# Patient Record
Sex: FEMALE | Race: WHITE | NOT HISPANIC OR LATINO | Employment: OTHER | URBAN - METROPOLITAN AREA
[De-identification: names, ages, dates, MRNs, and addresses within clinical notes are randomized per-mention and may not be internally consistent; named-entity substitution may affect disease eponyms.]

---

## 2017-07-13 ENCOUNTER — TELEPHONE (OUTPATIENT)
Dept: FAMILY MEDICINE | Facility: CLINIC | Age: 62
End: 2017-07-13

## 2017-07-13 NOTE — TELEPHONE ENCOUNTER
Panel Management Review      Patient has the following on her problem list:     Depression / Dysthymia review  PHQ-9 SCORE 8/14/2014 11/4/2015 10/26/2016   Total Score 0 - -   Total Score MyChart - - 1 (Minimal depression)   Total Score - 0 1      Patient is due for:  None      Composite cancer screening  Chart review shows that this patient is due/due soon for the following Mammogram  Summary:    Patient is due/failing the following:   MAMMOGRAM and PHQ9    Action needed:   Patient needs to do PHQ9. and Patient needs referral/order: mammogram    Type of outreach:    Sent [facility] message.    Questions for provider review:    None                                                                                                                                    None known       Chart routed to Care Team .

## 2017-08-14 ENCOUNTER — TELEPHONE (OUTPATIENT)
Dept: FAMILY MEDICINE | Facility: CLINIC | Age: 62
End: 2017-08-14

## 2017-10-16 ENCOUNTER — RADIANT APPOINTMENT (OUTPATIENT)
Dept: MAMMOGRAPHY | Facility: CLINIC | Age: 62
End: 2017-10-16
Payer: COMMERCIAL

## 2017-10-16 DIAGNOSIS — Z12.31 VISIT FOR SCREENING MAMMOGRAM: ICD-10-CM

## 2017-10-16 PROCEDURE — G0202 SCR MAMMO BI INCL CAD: HCPCS | Mod: TC

## 2017-12-03 ENCOUNTER — HEALTH MAINTENANCE LETTER (OUTPATIENT)
Age: 62
End: 2017-12-03

## 2018-02-08 DIAGNOSIS — F32.5 MAJOR DEPRESSION IN COMPLETE REMISSION (H): ICD-10-CM

## 2018-02-08 DIAGNOSIS — E78.5 HYPERLIPIDEMIA LDL GOAL <130: ICD-10-CM

## 2018-02-08 DIAGNOSIS — L70.0 CYSTIC ACNE: ICD-10-CM

## 2018-02-08 RX ORDER — AMOXICILLIN 500 MG/1
500 CAPSULE ORAL DAILY
Qty: 90 CAPSULE | Refills: 0 | Status: SHIPPED | OUTPATIENT
Start: 2018-02-08 | End: 2018-02-22

## 2018-02-08 RX ORDER — SIMVASTATIN 40 MG
TABLET ORAL
Qty: 90 TABLET | Refills: 0 | Status: SHIPPED | OUTPATIENT
Start: 2018-02-08 | End: 2018-02-22

## 2018-02-08 NOTE — TELEPHONE ENCOUNTER
Drug: Amoxicillin  Last Fill Date: 11/1/2017  Quantity: 90        Drug: Amitriptyline  Last Fill Date: 11/1/2017  Quantity: 360        Drug: Simvastatin  Last Fill Date: 11/1/2017  Quantity: 90

## 2018-02-22 ENCOUNTER — OFFICE VISIT (OUTPATIENT)
Dept: FAMILY MEDICINE | Facility: CLINIC | Age: 63
End: 2018-02-22
Payer: COMMERCIAL

## 2018-02-22 VITALS
HEIGHT: 62 IN | WEIGHT: 126 LBS | BODY MASS INDEX: 23.19 KG/M2 | HEART RATE: 79 BPM | DIASTOLIC BLOOD PRESSURE: 62 MMHG | RESPIRATION RATE: 18 BRPM | SYSTOLIC BLOOD PRESSURE: 103 MMHG | TEMPERATURE: 98.1 F

## 2018-02-22 DIAGNOSIS — L70.0 CYSTIC ACNE: ICD-10-CM

## 2018-02-22 DIAGNOSIS — N81.11 MIDLINE CYSTOCELE: ICD-10-CM

## 2018-02-22 DIAGNOSIS — F32.5 MAJOR DEPRESSION IN COMPLETE REMISSION (H): ICD-10-CM

## 2018-02-22 DIAGNOSIS — Z00.00 ROUTINE GENERAL MEDICAL EXAMINATION AT A HEALTH CARE FACILITY: Primary | ICD-10-CM

## 2018-02-22 DIAGNOSIS — N81.6 RECTOCELE: ICD-10-CM

## 2018-02-22 DIAGNOSIS — E78.5 HYPERLIPIDEMIA LDL GOAL <130: ICD-10-CM

## 2018-02-22 LAB
CHOLEST SERPL-MCNC: 202 MG/DL
GLUCOSE SERPL-MCNC: 89 MG/DL (ref 70–99)
HDLC SERPL-MCNC: 82 MG/DL
LDLC SERPL CALC-MCNC: 104 MG/DL
NONHDLC SERPL-MCNC: 120 MG/DL
TRIGL SERPL-MCNC: 78 MG/DL

## 2018-02-22 PROCEDURE — 82947 ASSAY GLUCOSE BLOOD QUANT: CPT | Performed by: FAMILY MEDICINE

## 2018-02-22 PROCEDURE — 36415 COLL VENOUS BLD VENIPUNCTURE: CPT | Performed by: FAMILY MEDICINE

## 2018-02-22 PROCEDURE — 99396 PREV VISIT EST AGE 40-64: CPT | Performed by: FAMILY MEDICINE

## 2018-02-22 PROCEDURE — 80061 LIPID PANEL: CPT | Performed by: FAMILY MEDICINE

## 2018-02-22 RX ORDER — SIMVASTATIN 40 MG
40 TABLET ORAL AT BEDTIME
Qty: 90 TABLET | Refills: 3 | Status: SHIPPED | OUTPATIENT
Start: 2018-02-22 | End: 2019-03-02

## 2018-02-22 RX ORDER — AMOXICILLIN 500 MG/1
500 CAPSULE ORAL DAILY
Qty: 90 CAPSULE | Refills: 3 | Status: SHIPPED | OUTPATIENT
Start: 2018-02-22 | End: 2019-03-02

## 2018-02-22 ASSESSMENT — PAIN SCALES - GENERAL: PAINLEVEL: NO PAIN (0)

## 2018-02-22 NOTE — NURSING NOTE
"Chief Complaint   Patient presents with     Physical       Initial /62  Pulse 79  Temp 98.1  F (36.7  C) (Tympanic)  Resp 18  Ht 5' 1.5\" (1.562 m)  Wt 126 lb (57.2 kg)  BMI 23.42 kg/m2 Estimated body mass index is 23.42 kg/(m^2) as calculated from the following:    Height as of this encounter: 5' 1.5\" (1.562 m).    Weight as of this encounter: 126 lb (57.2 kg).  Medication Reconciliation: complete    "

## 2018-02-22 NOTE — PROGRESS NOTES
SUBJECTIVE:   CC: Gilda Petit is an 62 year old woman who presents for preventive health visit.     Healthy Habits:    Do you get at least three servings of calcium containing foods daily (dairy, green leafy vegetables, etc.)? yes    Amount of exercise or daily activities, outside of work: 2-3 day(s) per week    Problems taking medications regularly No    Medication side effects: No    Have you had an eye exam in the past two years? yes    Do you see a dentist twice per year? yes    Do you have sleep apnea, excessive snoring or daytime drowsiness?no  Hyperlipidemia Follow-Up       Rate your low fat/cholesterol diet?: good    Taking statin?  Yes, no muscle aches from statin    Other lipid medications/supplements?:  none      Depression Followup    Status since last visit: Stable      See PHQ-9 for current symptoms.  Other associated symptoms: None    Complicating factors:   Significant life event:  No               Current substance abuse:  None  Anxiety or Panic symptoms:  No  Feels the amitriptyline works well, she has tried other medications in the past but likes the sedating effect of the amitriptyline.  We talked about alternatives and the need to add something for sleep if needed but at this time she'd like to stay with the amitriptyline     PHQ-9  English PHQ-9   Any Language         Uses Amoxicillin to prevent cystic acne.  Has done this for years, has tried other antibacterial agents (doxy/minocycline) and gets a return of acne.  Has tried going off the Amoxicillin but the acne develops rather quickly.  She is encouraged to take a probiotic and she understand the risk of resistant bacteria.       Today's PHQ-2 Score:   PHQ-2 ( 1999 [employer]) 2/22/2018 12/22/2016   Q1: Little interest or pleasure in doing things 0 0   Q2: Feeling down, depressed or hopeless 0 0   PHQ-2 Score 0 0   Q1: Little interest or pleasure in doing things - -   Q2: Feeling down, depressed or hopeless - -   PHQ-2 Score - -  "      Abuse: Current or Past(Physical, Sexual or Emotional)- No  Do you feel safe in your environment - Yes    Social History   Substance Use Topics     Smoking status: Never Smoker     Smokeless tobacco: Never Used     Alcohol use No     If you drink alcohol do you typically have >3 drinks per day or >7 drinks per week? No                     Reviewed orders with patient.  Reviewed health maintenance and updated orders accordingly - Yes  Labs reviewed in EPIC  BP Readings from Last 3 Encounters:   02/22/18 103/62   12/22/16 109/58   12/09/16 106/69    Wt Readings from Last 3 Encounters:   02/22/18 126 lb (57.2 kg)   12/22/16 120 lb 12.8 oz (54.8 kg)   12/09/16 122 lb (55.3 kg)                        Pertinent mammograms are reviewed under the imaging tab.  History of abnormal Pap smear: NO - age 30-65 PAP every 5 years with negative HPV co-testing recommended    Reviewed and updated as needed this visit by clinical staff  Tobacco  Allergies  Meds  Med Hx  Surg Hx  Fam Hx         Reviewed and updated as needed this visit by Provider            ROS:  C: NEGATIVE for fever, chills, change in weight  I: NEGATIVE for worrisome rashes, moles or lesions  E: NEGATIVE for vision changes or irritation  ENT: NEGATIVE for ear, mouth and throat problems  R: NEGATIVE for significant cough or SOB  B: NEGATIVE for masses, tenderness or discharge  CV: NEGATIVE for chest pain, palpitations or peripheral edema  GI: NEGATIVE for nausea, abdominal pain, heartburn, or change in bowel habits   menopausal female: still having some issues with prolapse, didn't see gyn last year and wants another referral.   M: NEGATIVE for significant arthralgias or myalgia  N: NEGATIVE for weakness, dizziness or paresthesias  P: NEGATIVE for changes in mood or affect     OBJECTIVE:   /62  Pulse 79  Temp 98.1  F (36.7  C) (Tympanic)  Resp 18  Ht 5' 1.5\" (1.562 m)  Wt 126 lb (57.2 kg)  BMI 23.42 kg/m2  EXAM:  GENERAL: healthy, alert and " no distress  EYES: Eyes grossly normal to inspection, PERRL and conjunctivae and sclerae normal  HENT: ear canals and TM's normal, nose and mouth without ulcers or lesions  NECK: no adenopathy, no asymmetry, masses, or scars and thyroid normal to palpation  RESP: lungs clear to auscultation - no rales, rhonchi or wheezes  BREAST: normal without masses, tenderness or nipple discharge and no palpable axillary masses or adenopathy  CV: regular rate and rhythm, normal S1 S2, no S3 or S4, no murmur, click or rub, no peripheral edema and peripheral pulses strong  ABDOMEN: soft, nontender, no hepatosplenomegaly, no masses and bowel sounds normal   (female): normal female external genitalia, normal urethral meatus , vaginal mucosal atrophy, normal cervix, adnexae, and uterus without masses., cystocele present and rectocele present  MS: no gross musculoskeletal defects noted, no edema  SKIN: no suspicious lesions or rashes  NEURO: Normal strength and tone, mentation intact and speech normal  PSYCH: mentation appears normal, affect normal/bright    ASSESSMENT/PLAN:   1. Routine general medical examination at a health care facility     - Glucose    2. Hyperlipidemia LDL goal <130     - simvastatin (ZOCOR) 40 MG tablet; Take 1 tablet (40 mg) by mouth At Bedtime  Dispense: 90 tablet; Refill: 3  - Lipid panel reflex to direct LDL Fasting    3. Cystic acne     - amoxicillin (AMOXIL) 500 MG capsule; Take 1 capsule (500 mg) by mouth daily  Dispense: 90 capsule; Refill: 3    4. Major depression in complete remission (H)     - amitriptyline (ELAVIL) 25 MG tablet; Take 4 tablets (100 mg) by mouth At Bedtime  Dispense: 360 tablet; Refill: 3    5. Midline cystocele     - OB/GYN REFERRAL    6. Rectocele     - OB/GYN REFERRAL    COUNSELING:   Reviewed preventive health counseling, as reflected in patient instructions       Regular exercise       Healthy diet/nutrition         reports that she has never smoked. She has never used  "smokeless tobacco.    Estimated body mass index is 23.42 kg/(m^2) as calculated from the following:    Height as of this encounter: 5' 1.5\" (1.562 m).    Weight as of this encounter: 126 lb (57.2 kg).       Counseling Resources:  ATP IV Guidelines  Pooled Cohorts Equation Calculator  Breast Cancer Risk Calculator  FRAX Risk Assessment  ICSI Preventive Guidelines  Dietary Guidelines for Americans, 2010  USDA's MyPlate  ASA Prophylaxis  Lung CA Screening    Gris Uribe MD  [facility]  "

## 2018-02-22 NOTE — MR AVS SNAPSHOT
After Visit Summary   2/22/2018    Gilda Petit    MRN: [id number]           Patient Information     Date Of Birth          1955        Visit Information        Provider Department      2/22/2018 8:40 AM Gris Uribe MD [facility]        Today's Diagnoses     Routine general medical examination at a health care facility    -  1    Hyperlipidemia LDL goal <130        Cystic acne        Major depression in complete remission (H)        Midline cystocele        Rectocele          Care Instructions          Thank you for choosing [facility].  You may be receiving a survey in the mail from [facility] regarding your visit today.  Please take a few minutes to complete and return the survey to let us know how we are doing.      Our Clinic hours are:  Mondays    7:20 am - 7 pm  Tues -  Fri  7:20 am - 5 pm    Clinic Phone: [phone]    The clinic lab opens at 7:30 am Mon - Fri and appointments are required.    [facility]  Ph. [phone]  Monday-Thursday 8 am - 7pm  Tues/Wed/Fri 8 am - 5:30 pm         Preventive Health Recommendations  Female Ages 50 - 64    Yearly exam: See your health care provider every year in order to  o Review health changes.   o Discuss preventive care.    o Review your medicines if your doctor has prescribed any.      Get a Pap test every three years (unless you have an abnormal result and your provider advises testing more often).    If you get Pap tests with HPV test, you only need to test every 5 years, unless you have an abnormal result.     You do not need a Pap test if your uterus was removed (hysterectomy) and you have not had cancer.    You should be tested each year for STDs (sexually transmitted diseases) if you're at risk.     Have a mammogram every 1 to 2 years.    Have a colonoscopy at age 50, or have a yearly FIT test (stool test). These exams screen for colon cancer.      Have a cholesterol test every 5 years,  or more often if advised.    Have a diabetes test (fasting glucose) every three years. If you are at risk for diabetes, you should have this test more often.     If you are at risk for osteoporosis (brittle bone disease), think about having a bone density scan (DEXA).    Shots: Get a flu shot each year. Get a tetanus shot every 10 years.    Nutrition:     Eat at least 5 servings of fruits and vegetables each day.    Eat whole-grain bread, whole-wheat pasta and brown rice instead of white grains and rice.    Talk to your provider about Calcium and Vitamin D.     Lifestyle    Exercise at least 150 minutes a week (30 minutes a day, 5 days a week). This will help you control your weight and prevent disease.    Limit alcohol to one drink per day.    No smoking.     Wear sunscreen to prevent skin cancer.     See your dentist every six months for an exam and cleaning.    See your eye doctor every 1 to 2 years.            Follow-ups after your visit        Additional Services     OB/GYN REFERRAL       Your provider has referred you to:  [facility]: [facility] [phone]   [email or web address]    Please be aware that coverage of these services is subject to the terms and limitations of your health insurance plan.  Call member services at your health plan with any benefit or coverage questions.      Please bring the following with you to your appointment:    (1) Any X-Rays, CTs or MRIs which have been performed.  Contact the facility where they were done to arrange for  prior to your scheduled appointment.   (2) List of current medications   (3) This referral request   (4) Any documents/labs given to you for this referral                  Your next 10 appointments already scheduled     Mar 16, 2018  9:30 AM CDT   [facility]    [address] [address] [phone]     "       Bring a current list of meds and any records pertaining to this visit. For Physicals, please bring immunization records and any forms needing to be filled out. Please arrive 10 minutes early to complete paperwork.              Who to contact     If you have questions or need follow up information about today's clinic visit or your schedule please contact [facility] directly at [phone].  Normal or non-critical lab and imaging results will be communicated to you by BetterYouhart, letter or phone within 4 business days after the clinic has received the results. If you do not hear from us within 7 days, please contact the clinic through [employer] or phone. If you have a critical or abnormal lab result, we will notify you by phone as soon as possible.  Submit refill requests through [employer] or call your pharmacy and they will forward the refill request to us. Please allow 3 business days for your refill to be completed.          Additional Information About Your Visit        BetterYouhart Information     [employer]fish gives you secure access to your electronic health record. If you see a primary care provider, you can also send messages to your care team and make appointments. If you have questions, please call your primary care clinic.  If you do not have a primary care provider, please call [phone] and they will assist you.        Care EveryWhere ID     This is your Care EveryWhere ID. This could be used by other organizations to access your [place] medical records  [id number]        Your Vitals Were     Pulse Temperature Respirations Height BMI (Body Mass Index)       79 98.1  F (36.7  C) (Tympanic) 18 5' 1.5\" (1.562 m) 23.42 kg/m2        Blood Pressure from Last 3 Encounters:   02/22/18 103/62   12/22/16 109/58   12/09/16 106/69    Weight from Last 3 Encounters:   02/22/18 126 lb (57.2 kg)   12/22/16 120 lb 12.8 oz (54.8 kg)   12/09/16 122 lb (55.3 kg)              We Performed the Following     " DEPRESSION ACTION PLAN (DAP)     Glucose     Lipid panel reflex to direct LDL Fasting     OB/GYN REFERRAL          Today's Medication Changes          These changes are accurate as of 2/22/18  9:16 AM.  If you have any questions, ask your nurse or doctor.               These medicines have changed or have updated prescriptions.        Dose/Directions    amitriptyline 25 MG tablet   Commonly known as:  ELAVIL   This may have changed:  See the new instructions.   Used for:  Major depression in complete remission (H)   Changed by:  Gris Uribe MD        Dose:  100 mg   Take 4 tablets (100 mg) by mouth At Bedtime   Quantity:  360 tablet   Refills:  3       simvastatin 40 MG tablet   Commonly known as:  ZOCOR   This may have changed:  See the new instructions.   Used for:  Hyperlipidemia LDL goal <130   Changed by:  Gris Uribe MD        Dose:  40 mg   Take 1 tablet (40 mg) by mouth At Bedtime   Quantity:  90 tablet   Refills:  3            Where to get your medicines      These medications were sent to [facility] - [address]  [address] [address]    Hours:  Mon-Fri 8:30am-5:00pm Toll Free [phone] Phone:  [phone]     amitriptyline 25 MG tablet    amoxicillin 500 MG capsule    simvastatin 40 MG tablet                Primary Care Provider Office Phone # Fax #    Gris Uribe [phone] [address] [address] [address]        Equal Access to Services     [facility] AH: Hadii aad ku hadasho Soomaali, waaxda luqadaha, qaybta kaalmada adeegyada, kendall hdez haysae west . So [facility] [phone].    ATENCIÓN: Si habla español, tiene a vasquez disposición servicios gratuitos de asistencia lingüística. Llame al [phone].    We comply with applicable federal civil rights laws and [place] laws. We do not discriminate on the basis of race, color, national origin, age, disability, sex,  sexual orientation, or gender identity.            Thank you!     Thank you for choosing [facility]  for your care. Our goal is always to provide you with excellent care. Hearing back from our patients is one way we can continue to improve our services. Please take a few minutes to complete the written survey that you may receive in the mail after your visit with us. Thank you!             Your Updated Medication List - Protect others around you: Learn how to safely use, store and throw away your medicines at www.disposemymeds.org.          This list is accurate as of 2/22/18  9:16 AM.  Always use your most recent med list.                   Brand Name Dispense Instructions for use Diagnosis    amitriptyline 25 MG tablet    ELAVIL    360 tablet    Take 4 tablets (100 mg) by mouth At Bedtime    Major depression in complete remission (H)       amoxicillin 500 MG capsule    AMOXIL    90 capsule    Take 1 capsule (500 mg) by mouth daily    Cystic acne       simvastatin 40 MG tablet    ZOCOR    90 tablet    Take 1 tablet (40 mg) by mouth At Bedtime    Hyperlipidemia LDL goal <130

## 2018-02-22 NOTE — PATIENT INSTRUCTIONS
Thank you for choosing [facility].  You may be receiving a survey in the mail from [facility] regarding your visit today.  Please take a few minutes to complete and return the survey to let us know how we are doing.      Our Clinic hours are:  Mondays    7:20 am - 7 pm  Tues - Fri  7:20 am - 5 pm    Clinic Phone: [phone]    The clinic lab opens at 7:30 am Mon - Fri and appointments are required.    [facility]. [phone]  Monday-Thursday 8 am - 7pm  Tues/Wed/Fri 8 am - 5:30 pm         Preventive Health Recommendations  Female Ages 50 - 64    Yearly exam: See your health care provider every year in order to  o Review health changes.   o Discuss preventive care.    o Review your medicines if your doctor has prescribed any.      Get a Pap test every three years (unless you have an abnormal result and your provider advises testing more often).    If you get Pap tests with HPV test, you only need to test every 5 years, unless you have an abnormal result.     You do not need a Pap test if your uterus was removed (hysterectomy) and you have not had cancer.    You should be tested each year for STDs (sexually transmitted diseases) if you're at risk.     Have a mammogram every 1 to 2 years.    Have a colonoscopy at age 50, or have a yearly FIT test (stool test). These exams screen for colon cancer.      Have a cholesterol test every 5 years, or more often if advised.    Have a diabetes test (fasting glucose) every three years. If you are at risk for diabetes, you should have this test more often.     If you are at risk for osteoporosis (brittle bone disease), think about having a bone density scan (DEXA).    Shots: Get a flu shot each year. Get a tetanus shot every 10 years.    Nutrition:     Eat at least 5 servings of fruits and vegetables each day.    Eat whole-grain bread, whole-wheat pasta and brown rice instead of white grains and rice.    Talk to your provider about Calcium  and Vitamin D.     Lifestyle    Exercise at least 150 minutes a week (30 minutes a day, 5 days a week). This will help you control your weight and prevent disease.    Limit alcohol to one drink per day.    No smoking.     Wear sunscreen to prevent skin cancer.     See your dentist every six months for an exam and cleaning.    See your eye doctor every 1 to 2 years.

## 2018-02-23 ASSESSMENT — PATIENT HEALTH QUESTIONNAIRE - PHQ9: SUM OF ALL RESPONSES TO PHQ QUESTIONS 1-9: 0

## 2018-03-16 ENCOUNTER — OFFICE VISIT (OUTPATIENT)
Dept: OBGYN | Facility: CLINIC | Age: 63
End: 2018-03-16
Payer: COMMERCIAL

## 2018-03-16 VITALS
HEIGHT: 62 IN | RESPIRATION RATE: 14 BRPM | SYSTOLIC BLOOD PRESSURE: 114 MMHG | DIASTOLIC BLOOD PRESSURE: 64 MMHG | BODY MASS INDEX: 23.37 KG/M2 | WEIGHT: 127 LBS | TEMPERATURE: 98.2 F | HEART RATE: 80 BPM

## 2018-03-16 DIAGNOSIS — N81.10 VAGINAL WALL PROLAPSE WITHOUT UTERINE PROLAPSE: Primary | ICD-10-CM

## 2018-03-16 LAB
ALBUMIN UR-MCNC: NEGATIVE MG/DL
APPEARANCE UR: CLEAR
BILIRUB UR QL STRIP: NEGATIVE
COLOR UR AUTO: YELLOW
GLUCOSE UR STRIP-MCNC: NEGATIVE MG/DL
HGB UR QL STRIP: NEGATIVE
KETONES UR STRIP-MCNC: NEGATIVE MG/DL
LEUKOCYTE ESTERASE UR QL STRIP: ABNORMAL
NITRATE UR QL: NEGATIVE
PH UR STRIP: 7 PH (ref 5–7)
RBC #/AREA URNS AUTO: NORMAL /HPF
SOURCE: ABNORMAL
SP GR UR STRIP: 1.01 (ref 1–1.03)
UROBILINOGEN UR STRIP-ACNC: 0.2 EU/DL (ref 0.2–1)
WBC #/AREA URNS AUTO: NORMAL /HPF

## 2018-03-16 PROCEDURE — 99203 OFFICE O/P NEW LOW 30 MIN: CPT | Performed by: OBSTETRICS & GYNECOLOGY

## 2018-03-16 PROCEDURE — 81001 URINALYSIS AUTO W/SCOPE: CPT | Performed by: OBSTETRICS & GYNECOLOGY

## 2018-03-16 PROCEDURE — 51798 US URINE CAPACITY MEASURE: CPT | Performed by: OBSTETRICS & GYNECOLOGY

## 2018-03-16 RX ORDER — ESTRADIOL 10 UG/1
INSERT VAGINAL
Qty: 30 TABLET | Refills: 1 | Status: ON HOLD | OUTPATIENT
Start: 2018-03-16 | End: 2018-09-25

## 2018-03-16 RX ORDER — OXYCODONE HCL 10 MG/1
10 TABLET, FILM COATED, EXTENDED RELEASE ORAL ONCE
Status: CANCELLED | OUTPATIENT
Start: 2018-03-16 | End: 2018-03-16

## 2018-03-16 RX ORDER — KETOROLAC TROMETHAMINE 30 MG/ML
30 INJECTION, SOLUTION INTRAMUSCULAR; INTRAVENOUS ONCE
Status: CANCELLED | OUTPATIENT
Start: 2018-03-16 | End: 2018-03-16

## 2018-03-16 RX ORDER — PHENAZOPYRIDINE HYDROCHLORIDE 200 MG/1
200 TABLET, FILM COATED ORAL ONCE
Status: CANCELLED | OUTPATIENT
Start: 2018-03-16 | End: 2018-03-16

## 2018-03-16 RX ORDER — CEFAZOLIN SODIUM 2 G/100ML
2 INJECTION, SOLUTION INTRAVENOUS
Status: CANCELLED | OUTPATIENT
Start: 2018-03-16

## 2018-03-16 NOTE — PROGRESS NOTES
Gilda is a 62 year old [id number]  female who presents for consultation as requested by Gris Uribe for known cystocele; she was seen in consult by Dr. Johnson in  for 2+ cystocele and poor bladder emptying seen on UDS; she was taught self cathing prn and had a trial of size 3 ring pessary; notes indicate the pessary helped some but she did not persist with it and decided to try to live with the symptoms.  Now she feels the prolapse is worse, and involving the rectum, has to work hard to empty bladder and to defecate; she has no incontinence, even when used pessry; she has not had a hysterectomy nor is she on any hormones.    Patient Active Problem List    Diagnosis Date Noted     Advance Care Planning 2011     Priority: Medium     Advance Care Planning 2016: Receipt of ACP document:  Received: Health Care Directive which was witnessed or notarized on 16.  Document not previously scanned.  Validation form completed and sent with document to be scanned.  Code Status needs to be updated to reflect choices in most recent ACP document.  Confirmed/documented designated decision maker(s).  Added by Brittney Ramirez RN, Advance Care Planning Liaison.  Advance Care Planning 11: Patient has completed an Advance/Health Care Directive (HCD), to bring in copy to be scanned into Epic. Xiomara Rhoades         Thyroid nodule 2011     Priority: Medium     2011  Repeat us in 1 year-- multiple small nodules        Major depression in complete remission (H) 2010     Priority: Medium     HYPERLIPIDEMIA LDL GOAL <130 10/31/2010     Priority: Medium     Contact dermatitis and other eczema, due to unspecified cause      Priority: Medium     Perioral dermatitis       Primary hypercoagulable state (H)      Priority: Medium     Factory V Leiden ( Heterogygate)       Cystocele, midline 2005     Priority: Medium     With h/o incomplete voiding; taught to self cath; tried size 3 ring with support  pessary in , partially improved her symptoms       Absence of menstruation 2004     Priority: Medium     Missed Menses         All systems were reviewed and pertinent information in noted in subjective/HPI.    Past Medical History:   Diagnosis Date     Absence of menstruation 2004    Missed Menses     Bipolar disorder, unspecified     Ulices GRUBER [phone]-Mood disorder (not otherwise specified) consider bipolar, hypomania and not depression.     Contact dermatitis and other eczema, due to unspecified cause     Perioral dermatitis     Manic disorder, recurrent episode, moderate (H)     Mood disorder (not otherwise specified) consider bipolar, hypomania and not depression.     Primary hypercoagulable state (H)     Factory V Leiden ( Heterogygate)       Past Surgical History:   Procedure Laterality Date     COLONOSCOPY  05     COLONOSCOPY N/A 2016    Procedure: COLONOSCOPY;  Surgeon: Ehasn Hager MD;  Location: [facility] DILATION/CURETTAGE DIAG/THER NON OB      D & C     SURGICAL HISTORY OF -        x3     SURGICAL HISTORY OF -       Miscarriages x2         Current Outpatient Prescriptions:      Omega-3 Fatty Acids (FISH OIL PO), , Disp: , Rfl:      estradiol (VAGIFEM) 10 MCG TABS vaginal tablet, Place 1 tab vaginally at bedtime daily for 5 days then twice a week until surgery, Disp: 30 tablet, Rfl: 1     simvastatin (ZOCOR) 40 MG tablet, Take 1 tablet (40 mg) by mouth At Bedtime, Disp: 90 tablet, Rfl: 3     amoxicillin (AMOXIL) 500 MG capsule, Take 1 capsule (500 mg) by mouth daily, Disp: 90 capsule, Rfl: 3     amitriptyline (ELAVIL) 25 MG tablet, Take 4 tablets (100 mg) by mouth At Bedtime, Disp: 360 tablet, Rfl: 3    ALLERGIES:  Metal [staples]; Tetracycline; and Adhesive tape    Social History     Social History     Marital status:      Spouse name: N/A     Number of children: N/A     Years of education: N/A     Social History Main Topics     Smoking status: Never  "Smoker     Smokeless tobacco: Never Used     Alcohol use No     Drug use: No     Sexual activity: Yes     Partners: Male     Other Topics Concern     Parent/Sibling W/ Cabg, Mi Or Angioplasty Before 65f 55m? Yes     father had 2 MIs and 2 strokes. age 45 @ first MI and 58 when he passed away     Social History Narrative       Family History   Problem Relation Age of Onset     Musculoskeletal Disorder Mother      Depression Mother      Neurologic Disorder Mother      HEART DISEASE Father      premature heart disease- in his 50s     CEREBROVASCULAR DISEASE Father      C.A.D. Father      Cardiovascular Father      Lipids Father      Hypertension Brother      Lipids Brother      Depression Sister      Eye Disorder Sister      factor 5 lieden     Lipids Sister      Thyroid Disease Sister      Depression Sister      Lipids Sister      Depression Daughter      Allergies Daughter      Arthritis Daughter        OBJECTIVE:  Vitals: /64 (BP Location: Right arm, Patient Position: Chair, Cuff Size: Adult Regular)  Pulse 80  Temp 98.2  F (36.8  C) (Tympanic)  Resp 14  Ht 5' 1.5\" (1.562 m)  Wt 127 lb (57.6 kg)  Breastfeeding? No  BMI 23.61 kg/m2 BMI= Body mass index is 23.61 kg/(m^2).   No LMP recorded. Patient is postmenopausal.     GENERAL APPEARANCE: healthy, alert and no distress  ABDOMEN:  soft, nontender, no hepato-splenomegaly or hernias  PELVIC:  EGBUS:  Perineum well preserved both post wall visible with strain; no leak with cough  VAGINA:  2+ proximal cystocele; UV angle not hypermobile; 2-3+ distal rectocele; apex well supported with some narrowing around cervix; poorly estrogenized  CERVIX:  smooth, non-friable, no gross lesions, thin-layer PAP was not taken   UTERUS:  anteverted, not enlarged, non tender  ADNEXAE:  non-tender, no masses palpable, no cul de sac nodularity    POSTVOID RESIDUAL=0cc    ASSESSMENT:      ICD-10-CM    1. Vaginal wall prolapse without uterine prolapse N81.10 *UA reflex to " Microscopic     Omega-3 Fatty Acids (FISH OIL PO)     MEASURE POST-VOID RESIDUAL URINE/BLADDER CAPACITY, US NON-IMAGING     Urine Microscopic     estradiol (VAGIFEM) 10 MCG TABS vaginal tablet       PLAN:  I discussed at length with Gilda that although the cystocele seems the same by observation, she now has a rectocele that is likely new; we discussed conservative tx with pessary vs surgery with a & P repair, the possibility of uncovering hidden incontinence; that we would not likely need to remove the uterus.  I also discussed the ultra low dose vaginal estradiol therapy that can help prepare the vagina for a better repair  Zack Holcomb MD  [facility]    Cc: Dr. Uribe    Duration of visit:  30 minutes, >50% in discussion of current issues, treatment options and treatment planning.  ZACK Holcomb MD

## 2018-03-16 NOTE — NURSING NOTE
"Chief Complaint   Patient presents with     Consult     bladder prolapse       Initial /64 (BP Location: Right arm, Patient Position: Chair, Cuff Size: Adult Regular)  Pulse 80  Temp 98.2  F (36.8  C) (Tympanic)  Resp 14  Ht 5' 1.5\" (1.562 m)  Wt 127 lb (57.6 kg)  Breastfeeding? No  BMI 23.61 kg/m2 Estimated body mass index is 23.61 kg/(m^2) as calculated from the following:    Height as of this encounter: 5' 1.5\" (1.562 m).    Weight as of this encounter: 127 lb (57.6 kg).  Medication Reconciliation: complete   Ene Mir, LAMBERTO      "

## 2018-03-16 NOTE — MR AVS SNAPSHOT
"              After Visit Summary   3/16/2018    Gilda Petit    MRN: [id number]           Patient Information     Date Of Birth          1955        Visit Information        Provider Department      3/16/2018 9:30 AM Julienne Holcomb MD [facility]        Today's Diagnoses     Vaginal wall prolapse without uterine prolapse    -  1       Follow-ups after your visit        Who to contact     If you have questions or need follow up information about today's clinic visit or your schedule please contact [facility] directly at [phone].  Normal or non-critical lab and imaging results will be communicated to you by [employer], letter or phone within 4 business days after the clinic has received the results. If you do not hear from us within 7 days, please contact the clinic through [employer] or phone. If you have a critical or abnormal lab result, we will notify you by phone as soon as possible.  Submit refill requests through [employer] or call your pharmacy and they will forward the refill request to us. Please allow 3 business days for your refill to be completed.          Additional Information About Your Visit        MyChart Information     [employer] gives you secure access to your electronic health record. If you see a primary care provider, you can also send messages to your care team and make appointments. If you have questions, please call your primary care clinic.  If you do not have a primary care provider, please call [phone] and they will assist you.        Care EveryWhere ID     This is your Care EveryWhere ID. This could be used by other organizations to access your [place] medical records  [id number]        Your Vitals Were     Pulse Temperature Respirations Height Breastfeeding? BMI (Body Mass Index)    80 98.2  F (36.8  C) (Tympanic) 14 5' 1.5\" (1.562 m) No 23.61 kg/m2       Blood Pressure from Last 3 Encounters:   03/16/18 114/64   02/22/18 103/62   12/22/16 " 109/58    Weight from Last 3 Encounters:   03/16/18 127 lb (57.6 kg)   02/22/18 126 lb (57.2 kg)   12/22/16 120 lb 12.8 oz (54.8 kg)              We Performed the Following     *UA reflex to Microscopic     MEASURE POST-VOID RESIDUAL URINE/BLADDER CAPACITY, US NON-IMAGING     [facility]ero-Operative Worksheet GYN     Urine Microscopic          Today's Medication Changes          These changes are accurate as of 3/16/18 10:07 AM.  If you have any questions, ask your nurse or doctor.               Start taking these medicines.        Dose/Directions    estradiol 10 MCG Tabs vaginal tablet   Commonly known as:  VAGIFEM   Used for:  Vaginal wall prolapse without uterine prolapse   Started by:  Julienne Holcomb MD        Place 1 tab vaginally at bedtime daily for 5 days then twice a week until surgery   Quantity:  30 tablet   Refills:  1            Where to get your medicines      These medications were sent to [facility] [place] [address] [facility]  [address] [address]    Hours:  Mon-Fri 8:30am-5:00pm Toll Free [phone] Phone:  [phone]     estradiol 10 MCG Tabs vaginal tablet                Primary Care Provider Office Phone # Fax #    Gris Uribe [phone] [address] [facility] [address]        Equal Access to Services     FREDERICK RAMOS AH: Hadii bal ku hadasho Soomaali, waaxda luqadaha, qaybta kaalmada adeegyada, kendall medrano. So [facility] [phone].    ATENCIÓN: Si habla español, tiene a vasquez disposición servicios gratuitos de asistencia lingüística. Llame al [phone].    We comply with applicable federal civil rights laws and [place] laws. We do not discriminate on the basis of race, color, national origin, age, disability, sex, sexual orientation, or gender identity.            Thank you!     Thank you for choosing [facility]  for your care. Our goal is always to  provide you with excellent care. Hearing back from our patients is one way we can continue to improve our services. Please take a few minutes to complete the written survey that you may receive in the mail after your visit with us. Thank you!             Your Updated Medication List - Protect others around you: Learn how to safely use, store and throw away your medicines at www.disposemymeds.org.          This list is accurate as of 3/16/18 10:07 AM.  Always use your most recent med list.                   Brand Name Dispense Instructions for use Diagnosis    amitriptyline 25 MG tablet    ELAVIL    360 tablet    Take 4 tablets (100 mg) by mouth At Bedtime    Major depression in complete remission (H)       amoxicillin 500 MG capsule    AMOXIL    90 capsule    Take 1 capsule (500 mg) by mouth daily    Cystic acne       estradiol 10 MCG Tabs vaginal tablet    VAGIFEM    30 tablet    Place 1 tab vaginally at bedtime daily for 5 days then twice a week until surgery    Vaginal wall prolapse without uterine prolapse       FISH OIL PO       Vaginal wall prolapse without uterine prolapse       simvastatin 40 MG tablet    ZOCOR    90 tablet    Take 1 tablet (40 mg) by mouth At Bedtime    Hyperlipidemia LDL goal <130

## 2018-06-14 ENCOUNTER — TELEPHONE (OUTPATIENT)
Dept: OBGYN | Facility: CLINIC | Age: 63
End: 2018-06-14

## 2018-06-14 NOTE — TELEPHONE ENCOUNTER
Reason for Call:  Other surgery    Detailed comments: Pt called to let you know that she would like to schedule the surgery you recommended for her at her last appointment. Pt would like to do it late August or early September.    Phone Number Patient can be reached at: Home number on file [phone] (home)    Best Time: any    Can we leave a detailed message on this number? YES    Call taken on 6/14/2018 at 12:08 PM by Gris Tate

## 2018-06-15 DIAGNOSIS — N81.10 VAGINAL WALL PROLAPSE: Primary | ICD-10-CM

## 2018-06-15 RX ORDER — OXYCODONE HCL 10 MG/1
10 TABLET, FILM COATED, EXTENDED RELEASE ORAL ONCE
Status: CANCELLED | OUTPATIENT
Start: 2018-06-15 | End: 2018-06-15

## 2018-06-15 RX ORDER — CEFAZOLIN SODIUM 1 G/50ML
1 INJECTION, SOLUTION INTRAVENOUS SEE ADMIN INSTRUCTIONS
Status: CANCELLED | OUTPATIENT
Start: 2018-06-15

## 2018-06-15 RX ORDER — PHENAZOPYRIDINE HYDROCHLORIDE 200 MG/1
200 TABLET, FILM COATED ORAL ONCE
Status: CANCELLED | OUTPATIENT
Start: 2018-06-15 | End: 2018-06-15

## 2018-06-15 RX ORDER — CEFAZOLIN SODIUM 2 G/100ML
2 INJECTION, SOLUTION INTRAVENOUS
Status: CANCELLED | OUTPATIENT
Start: 2018-06-15

## 2018-06-20 ENCOUNTER — HOSPITAL ENCOUNTER (OUTPATIENT)
Facility: CLINIC | Age: 63
End: 2018-06-20
Attending: OBSTETRICS & GYNECOLOGY | Admitting: OBSTETRICS & GYNECOLOGY
Payer: COMMERCIAL

## 2018-06-20 NOTE — TELEPHONE ENCOUNTER
Type of surgery:   Anterior and posterior vaginal repair       Reason for procedure tighten vaginal walls to treat        [facility]  Date and time of surgery: 8/28/18 @ 7:30 AM  Surgeon: CORI  Pre-Op Appt Date: pt will schedule  Post-Op Appt Date: 2 weeks   Packet sent out: Yes  Pre-cert/Authorization completed:  No  Date: 6/20/18

## 2018-06-28 NOTE — TELEPHONE ENCOUNTER
Pt called to reschedule surgery into Sept.  Cancelled 8-28-18 surgery - will have to call pt when Sept calendar is available.    -Mallory SPRAGUE [facility]  Clinic Station [place]

## 2018-07-12 NOTE — TELEPHONE ENCOUNTER
Pt called to reschedule surgery to 9-25-18 at 7:30am.    -Mallory SPRAGUE [facility]  Clinic Station

## 2018-07-12 NOTE — TELEPHONE ENCOUNTER
Reason for Call:  Other call back    Detailed comments: Pt is wondering how long she will be off from work.  Will send to provider to advise    Phone Number Patient can be reached at: Home number on file [phone] (home)    Best Time: any    Can we leave a detailed message on this number? YES    Call taken on 7/12/2018 at 2:36 PM by Mallory Escobar

## 2018-07-12 NOTE — TELEPHONE ENCOUNTER
"  You are now scheduled for surgery at [facility].  Below are the details for your surgery.  Please read the \"Preparing for Your Surgery\" instructions and let us know if you have any questions.    Type of surgery: Anterior and posterior vaginal repair  Surgeon:  Julienne Holcomb MD  Location of surgery: [facility] OR    Date of surgery: 9-18-18    Time: 7:30am   Arrival Time: 6:00am    Time can change, to be confirmed a couple of days prior by pre-op surgery nurse.    Pre-Op Appt Date: Patient to schedule with a PCP or Family Practice Provider within 30 days to the surgery.  Post-Op Appt Date: To be determined by provider     Packet sent out: Yes  Pre-cert/Authorization completed:  TBD by Financial Securing Office.   MA Sterilization/Hysterectomy Acknowledgment Consent signed: Not Applicable    [facility]  [phone]    Fax: [phone]  Same Day Surgery [phone]  Fax: [phone]    "

## 2018-07-12 NOTE — TELEPHONE ENCOUNTER
If job does not involve lifting, then could return after two weeks, otherwise is 6 week recovery   Julienne Holcomb

## 2018-08-30 ENCOUNTER — TELEPHONE (OUTPATIENT)
Dept: OBGYN | Facility: CLINIC | Age: 63
End: 2018-08-30

## 2018-08-30 NOTE — TELEPHONE ENCOUNTER
Forms were signed and faxed then sent to be scanned and copy at .    Gris Tate  Clinic Station

## 2018-08-30 NOTE — TELEPHONE ENCOUNTER
Reason for Call:  Form, our goal is to have forms completed with 72 hours, however, some forms may require a visit or additional information.    Type of letter, form or note:  disability    Who is the form from?: Patient    Where did the form come from: Patient or family brought in       What clinic location was the form placed at?: [facility]    Where the form was placed: Given to physician    What number is listed as a contact on the form?: [phone]       Additional comments: FV      Call taken on 8/30/2018 at 3:20 PM by Gris Tate

## 2018-09-20 ENCOUNTER — OFFICE VISIT (OUTPATIENT)
Dept: FAMILY MEDICINE | Facility: CLINIC | Age: 63
End: 2018-09-20
Payer: COMMERCIAL

## 2018-09-20 VITALS
TEMPERATURE: 98.1 F | BODY MASS INDEX: 22.82 KG/M2 | HEART RATE: 80 BPM | SYSTOLIC BLOOD PRESSURE: 112 MMHG | OXYGEN SATURATION: 99 % | HEIGHT: 62 IN | WEIGHT: 124 LBS | RESPIRATION RATE: 18 BRPM | DIASTOLIC BLOOD PRESSURE: 56 MMHG

## 2018-09-20 DIAGNOSIS — Z01.818 PREOP GENERAL PHYSICAL EXAM: Primary | ICD-10-CM

## 2018-09-20 DIAGNOSIS — Z23 NEED FOR PROPHYLACTIC VACCINATION AND INOCULATION AGAINST INFLUENZA: ICD-10-CM

## 2018-09-20 DIAGNOSIS — N81.10 VAGINAL WALL PROLAPSE WITHOUT UTERINE PROLAPSE: ICD-10-CM

## 2018-09-20 DIAGNOSIS — R61 DIAPHORESIS: ICD-10-CM

## 2018-09-20 LAB
ANION GAP SERPL CALCULATED.3IONS-SCNC: 4 MMOL/L (ref 3–14)
BASOPHILS # BLD AUTO: 0 10E9/L (ref 0–0.2)
BASOPHILS NFR BLD AUTO: 0.2 %
BUN SERPL-MCNC: 16 MG/DL (ref 7–30)
CALCIUM SERPL-MCNC: 9.3 MG/DL (ref 8.5–10.1)
CHLORIDE SERPL-SCNC: 103 MMOL/L (ref 94–109)
CO2 SERPL-SCNC: 29 MMOL/L (ref 20–32)
CREAT SERPL-MCNC: 0.83 MG/DL (ref 0.52–1.04)
DIFFERENTIAL METHOD BLD: NORMAL
EOSINOPHIL # BLD AUTO: 0.1 10E9/L (ref 0–0.7)
EOSINOPHIL NFR BLD AUTO: 0.9 %
ERYTHROCYTE [DISTWIDTH] IN BLOOD BY AUTOMATED COUNT: 12.9 % (ref 10–15)
GFR SERPL CREATININE-BSD FRML MDRD: 70 ML/MIN/1.7M2
GLUCOSE SERPL-MCNC: 98 MG/DL (ref 70–99)
HCT VFR BLD AUTO: 38.6 % (ref 35–47)
HGB BLD-MCNC: 12.6 G/DL (ref 11.7–15.7)
LYMPHOCYTES # BLD AUTO: 1.6 10E9/L (ref 0.8–5.3)
LYMPHOCYTES NFR BLD AUTO: 19.7 %
MCH RBC QN AUTO: 29.5 PG (ref 26.5–33)
MCHC RBC AUTO-ENTMCNC: 32.6 G/DL (ref 31.5–36.5)
MCV RBC AUTO: 90 FL (ref 78–100)
MONOCYTES # BLD AUTO: 0.6 10E9/L (ref 0–1.3)
MONOCYTES NFR BLD AUTO: 7.3 %
NEUTROPHILS # BLD AUTO: 5.8 10E9/L (ref 1.6–8.3)
NEUTROPHILS NFR BLD AUTO: 71.9 %
PLATELET # BLD AUTO: 386 10E9/L (ref 150–450)
POTASSIUM SERPL-SCNC: 4.2 MMOL/L (ref 3.4–5.3)
RBC # BLD AUTO: 4.27 10E12/L (ref 3.8–5.2)
SODIUM SERPL-SCNC: 136 MMOL/L (ref 133–144)
TSH SERPL DL<=0.005 MIU/L-ACNC: 1.51 MU/L (ref 0.4–4)
WBC # BLD AUTO: 8 10E9/L (ref 4–11)

## 2018-09-20 PROCEDURE — 90471 IMMUNIZATION ADMIN: CPT | Performed by: FAMILY MEDICINE

## 2018-09-20 PROCEDURE — 99214 OFFICE O/P EST MOD 30 MIN: CPT | Mod: 25 | Performed by: FAMILY MEDICINE

## 2018-09-20 PROCEDURE — 85025 COMPLETE CBC W/AUTO DIFF WBC: CPT | Performed by: FAMILY MEDICINE

## 2018-09-20 PROCEDURE — 36415 COLL VENOUS BLD VENIPUNCTURE: CPT | Performed by: FAMILY MEDICINE

## 2018-09-20 PROCEDURE — 84443 ASSAY THYROID STIM HORMONE: CPT | Performed by: FAMILY MEDICINE

## 2018-09-20 PROCEDURE — 80048 BASIC METABOLIC PNL TOTAL CA: CPT | Performed by: FAMILY MEDICINE

## 2018-09-20 PROCEDURE — 90686 IIV4 VACC NO PRSV 0.5 ML IM: CPT | Performed by: FAMILY MEDICINE

## 2018-09-20 ASSESSMENT — PAIN SCALES - GENERAL: PAINLEVEL: NO PAIN (0)

## 2018-09-20 NOTE — MR AVS SNAPSHOT
After Visit Summary   9/20/2018    Gilda Petit    MRN: [id number]           Patient Information     Date Of Birth          1955        Visit Information        Provider Department      9/20/2018 1:00 PM Gris Uribe MD [facility]        Today's Diagnoses     Preop general physical exam    -  1    Vaginal wall prolapse without uterine prolapse        Need for prophylactic vaccination and inoculation against influenza        Diaphoresis          Care Instructions          Thank you for choosing [facility].  You may be receiving a survey in the mail from Delia Archibald regarding your visit today.  Please take a few minutes to complete and return the survey to let us know how we are doing.      Our Clinic hours are:  Mondays    7:20 am - 7 pm  Tues - Fri  7:20 am - 5 pm    Clinic Phone: [phone]    The clinic lab opens at 7:30 am Mon - Fri and appointments are required.    [facility]. [phone]  Monday  8 am - 7pm  Tues - Fri 8 am - 5:30 pm         Before Your Surgery      Call your surgeon if there is any change in your health. This includes signs of a cold or flu (such as a sore throat, runny nose, cough, rash or fever).    Do not smoke, drink alcohol or take over the counter medicine (unless your surgeon or primary care doctor tells you to) for the 24 hours before and after surgery.    If you take prescribed drugs: Follow your doctor s orders about which medicines to take and which to stop until after surgery.    Eating and drinking prior to surgery: follow the instructions from your surgeon    Take a shower or bath the night before surgery. Use the soap your surgeon gave you to gently clean your skin. If you do not have soap from your surgeon, use your regular soap. Do not shave or scrub the surgery site.  Wear clean pajamas and have clean sheets on your bed.           Follow-ups after your visit        Your next 10 appointments already  "scheduled     Sep 25, 2018   Procedure with Julienne Holcomb MD   [facility] (--)    [address] [address] [phone]           The medical center is located at [address]. (between [address] and [address] in [place], [place] north of [place]).              Who to contact     If you have questions or need follow up information about today's clinic visit or your schedule please contact [facility] directly at [phone].  Normal or non-critical lab and imaging results will be communicated to you by [employer], letter or phone within 4 business days after the clinic has received the results. If you do not hear from us within 7 days, please contact the clinic through [employer] or phone. If you have a critical or abnormal lab result, we will notify you by phone as soon as possible.  Submit refill requests through Who@ or call your pharmacy and they will forward the refill request to us. Please allow 3 business days for your refill to be completed.          Additional Information About Your Visit        MyChart Information     Who@ gives you secure access to your electronic health record. If you see a primary care provider, you can also send messages to your care team and make appointments. If you have questions, please call your primary care clinic.  If you do not have a primary care provider, please call [phone] and they will assist you.        Care EveryWhere ID     This is your Care EveryWhere ID. This could be used by other organizations to access your [place] medical records  [id number]        Your Vitals Were     Pulse Temperature Respirations Height Pulse Oximetry Breastfeeding?    80 98.1  F (36.7  C) (Tympanic) 18 5' 1.5\" (1.562 m) 99% No    BMI (Body Mass Index)                   23.05 kg/m2            Blood Pressure from Last 3 Encounters:   09/20/18 112/56   03/16/18 114/64   02/22/18 103/62    Weight from Last 3 " Encounters:   09/20/18 124 lb (56.2 kg)   03/16/18 127 lb (57.6 kg)   02/22/18 126 lb (57.2 kg)              We Performed the Following     Basic metabolic panel     CBC with platelets differential     FLU VACCINE, SPLIT VIRUS, IM (QUADRIVALENT) [47364]- >3 YRS     TSH with free T4 reflex     Vaccine Administration, Initial [15314]        Primary Care Provider Office Phone # Fax #    Gris Uribe [phone] [address] [address]        Equal Access to Services     [facility]: Hadii bal jiménez hadasho Sobambi, waaxda luqadaha, qaybta kaalmada yung, kendall west . So [facility] [phone].    ATENCIÓN: Si habla español, tiene a vasquez disposición servicios gratuitos de asistencia lingüística. [facility] [phone].    We comply with applicable federal civil rights laws and [place] laws. We do not discriminate on the basis of race, color, national origin, age, disability, sex, sexual orientation, or gender identity.            Thank you!     Thank you for choosing [facility]  for your care. Our goal is always to provide you with excellent care. Hearing back from our patients is one way we can continue to improve our services. Please take a few minutes to complete the written survey that you may receive in the mail after your visit with us. Thank you!             Your Updated Medication List - Protect others around you: Learn how to safely use, store and throw away your medicines at www.disposemymeds.org.          This list is accurate as of 9/20/18  1:17 PM.  Always use your most recent med list.                   Brand Name Dispense Instructions for use Diagnosis    amitriptyline 25 MG tablet    ELAVIL    360 tablet    Take 4 tablets (100 mg) by mouth At Bedtime    Major depression in complete remission (H)       amoxicillin 500 MG capsule    AMOXIL    90 capsule    Take 1 capsule (500 mg) by mouth daily    Cystic acne        estradiol 10 MCG Tabs vaginal tablet    VAGIFEM    30 tablet    Place 1 tab vaginally at bedtime daily for 5 days then twice a week until surgery    Vaginal wall prolapse without uterine prolapse       FISH OIL PO       Vaginal wall prolapse without uterine prolapse       simvastatin 40 MG tablet    ZOCOR    90 tablet    Take 1 tablet (40 mg) by mouth At Bedtime    Hyperlipidemia LDL goal <130

## 2018-09-20 NOTE — PATIENT INSTRUCTIONS
Thank you for choosing [facility].  You may be receiving a survey in the mail from Delia Archibald regarding your visit today.  Please take a few minutes to complete and return the survey to let us know how we are doing.      Our Clinic hours are:  Mondays    7:20 am - 7 pm  Tues - Fri  7:20 am - 5 pm    Clinic Phone: [phone]    The clinic lab opens at 7:30 am Mon - Fri and appointments are required.    [facility]. [phone]  Monday  8 am - 7pm  Tues - Fri 8 am - 5:30 pm         Before Your Surgery      Call your surgeon if there is any change in your health. This includes signs of a cold or flu (such as a sore throat, runny nose, cough, rash or fever).    Do not smoke, drink alcohol or take over the counter medicine (unless your surgeon or primary care doctor tells you to) for the 24 hours before and after surgery.    If you take prescribed drugs: Follow your doctor s orders about which medicines to take and which to stop until after surgery.    Eating and drinking prior to surgery: follow the instructions from your surgeon    Take a shower or bath the night before surgery. Use the soap your surgeon gave you to gently clean your skin. If you do not have soap from your surgeon, use your regular soap. Do not shave or scrub the surgery site.  Wear clean pajamas and have clean sheets on your bed.

## 2018-09-21 ASSESSMENT — PATIENT HEALTH QUESTIONNAIRE - PHQ9: SUM OF ALL RESPONSES TO PHQ QUESTIONS 1-9: 0

## 2018-09-21 NOTE — PROGRESS NOTES
Gilda,    All of the labs were normal or acceptable.  Kidney function, electrolytes normal. Thyroid is in normal range.  Normal CBC, no anemia.    Please contact my office if you have questions.    Gris Uribe M.D.

## 2018-09-24 ENCOUNTER — ANESTHESIA EVENT (OUTPATIENT)
Dept: SURGERY | Facility: CLINIC | Age: 63
End: 2018-09-24
Payer: COMMERCIAL

## 2018-09-25 ENCOUNTER — SURGERY (OUTPATIENT)
Age: 63
End: 2018-09-25

## 2018-09-25 ENCOUNTER — ANESTHESIA (OUTPATIENT)
Dept: SURGERY | Facility: CLINIC | Age: 63
End: 2018-09-25
Payer: COMMERCIAL

## 2018-09-25 ENCOUNTER — HOSPITAL ENCOUNTER (OUTPATIENT)
Facility: CLINIC | Age: 63
Discharge: HOME | End: 2018-09-25
Attending: OBSTETRICS & GYNECOLOGY | Admitting: OBSTETRICS & GYNECOLOGY
Payer: COMMERCIAL

## 2018-09-25 VITALS
RESPIRATION RATE: 16 BRPM | SYSTOLIC BLOOD PRESSURE: 109 MMHG | DIASTOLIC BLOOD PRESSURE: 41 MMHG | OXYGEN SATURATION: 100 % | BODY MASS INDEX: 22.82 KG/M2 | WEIGHT: 124 LBS | HEART RATE: 80 BPM | TEMPERATURE: 97.2 F | HEIGHT: 62 IN

## 2018-09-25 DIAGNOSIS — Z98.890 POSTOPERATIVE STATE: Primary | ICD-10-CM

## 2018-09-25 PROBLEM — N81.10 VAGINAL WALL PROLAPSE: Status: ACTIVE | Noted: 2018-09-25

## 2018-09-25 PROCEDURE — 25000128 H RX IP 250 OP 636: Performed by: OBSTETRICS & GYNECOLOGY

## 2018-09-25 PROCEDURE — 57260 CMBN ANT PST COLPRHY: CPT | Performed by: OBSTETRICS & GYNECOLOGY

## 2018-09-25 PROCEDURE — 27110028 ZZH OR GENERAL SUPPLY NON-STERILE: Performed by: OBSTETRICS & GYNECOLOGY

## 2018-09-25 PROCEDURE — 37000008 ZZH ANESTHESIA TECHNICAL FEE, 1ST 30 MIN: Performed by: OBSTETRICS & GYNECOLOGY

## 2018-09-25 PROCEDURE — 36000056 ZZH SURGERY LEVEL 3 1ST 30 MIN: Performed by: OBSTETRICS & GYNECOLOGY

## 2018-09-25 PROCEDURE — 25000128 H RX IP 250 OP 636: Performed by: NURSE ANESTHETIST, CERTIFIED REGISTERED

## 2018-09-25 PROCEDURE — 25000132 ZZH RX MED GY IP 250 OP 250 PS 637: Performed by: OBSTETRICS & GYNECOLOGY

## 2018-09-25 PROCEDURE — 37000009 ZZH ANESTHESIA TECHNICAL FEE, EACH ADDTL 15 MIN: Performed by: OBSTETRICS & GYNECOLOGY

## 2018-09-25 PROCEDURE — 25000125 ZZHC RX 250: Performed by: NURSE ANESTHETIST, CERTIFIED REGISTERED

## 2018-09-25 PROCEDURE — 57260 CMBN ANT PST COLPRHY: CPT | Mod: 80 | Performed by: OBSTETRICS & GYNECOLOGY

## 2018-09-25 PROCEDURE — 71000027 ZZH RECOVERY PHASE 2 EACH 15 MINS: Performed by: OBSTETRICS & GYNECOLOGY

## 2018-09-25 PROCEDURE — 27210794 ZZH OR GENERAL SUPPLY STERILE: Performed by: OBSTETRICS & GYNECOLOGY

## 2018-09-25 PROCEDURE — 36000058 ZZH SURGERY LEVEL 3 EA 15 ADDTL MIN: Performed by: OBSTETRICS & GYNECOLOGY

## 2018-09-25 PROCEDURE — 25000125 ZZHC RX 250: Performed by: OBSTETRICS & GYNECOLOGY

## 2018-09-25 PROCEDURE — 40000305 ZZH STATISTIC PRE PROC ASSESS I: Performed by: OBSTETRICS & GYNECOLOGY

## 2018-09-25 RX ORDER — KETOROLAC TROMETHAMINE 30 MG/ML
INJECTION, SOLUTION INTRAMUSCULAR; INTRAVENOUS PRN
Status: DISCONTINUED | OUTPATIENT
Start: 2018-09-25 | End: 2018-09-25

## 2018-09-25 RX ORDER — LIDOCAINE HYDROCHLORIDE 10 MG/ML
INJECTION, SOLUTION INFILTRATION; PERINEURAL PRN
Status: DISCONTINUED | OUTPATIENT
Start: 2018-09-25 | End: 2018-09-25

## 2018-09-25 RX ORDER — NALOXONE HYDROCHLORIDE 0.4 MG/ML
.1-.4 INJECTION, SOLUTION INTRAMUSCULAR; INTRAVENOUS; SUBCUTANEOUS
Status: DISCONTINUED | OUTPATIENT
Start: 2018-09-25 | End: 2018-09-25 | Stop reason: HOSPADM

## 2018-09-25 RX ORDER — LIDOCAINE 40 MG/G
CREAM TOPICAL
Status: DISCONTINUED | OUTPATIENT
Start: 2018-09-25 | End: 2018-09-25 | Stop reason: HOSPADM

## 2018-09-25 RX ORDER — OXYCODONE HCL 10 MG/1
10 TABLET, FILM COATED, EXTENDED RELEASE ORAL ONCE
Status: DISCONTINUED | OUTPATIENT
Start: 2018-09-25 | End: 2018-09-25 | Stop reason: HOSPADM

## 2018-09-25 RX ORDER — HYDROMORPHONE HYDROCHLORIDE 1 MG/ML
.3-.5 INJECTION, SOLUTION INTRAMUSCULAR; INTRAVENOUS; SUBCUTANEOUS EVERY 10 MIN PRN
Status: DISCONTINUED | OUTPATIENT
Start: 2018-09-25 | End: 2018-09-25 | Stop reason: HOSPADM

## 2018-09-25 RX ORDER — ONDANSETRON 2 MG/ML
4 INJECTION INTRAMUSCULAR; INTRAVENOUS EVERY 30 MIN PRN
Status: DISCONTINUED | OUTPATIENT
Start: 2018-09-25 | End: 2018-09-25 | Stop reason: HOSPADM

## 2018-09-25 RX ORDER — PHENAZOPYRIDINE HYDROCHLORIDE 200 MG/1
200 TABLET, FILM COATED ORAL ONCE
Status: COMPLETED | OUTPATIENT
Start: 2018-09-25 | End: 2018-09-25

## 2018-09-25 RX ORDER — CEFAZOLIN SODIUM 1 G/50ML
1 INJECTION, SOLUTION INTRAVENOUS SEE ADMIN INSTRUCTIONS
Status: DISCONTINUED | OUTPATIENT
Start: 2018-09-25 | End: 2018-09-25 | Stop reason: HOSPADM

## 2018-09-25 RX ORDER — KETOROLAC TROMETHAMINE 30 MG/ML
30 INJECTION, SOLUTION INTRAMUSCULAR; INTRAVENOUS ONCE
Status: DISCONTINUED | OUTPATIENT
Start: 2018-09-25 | End: 2018-09-25 | Stop reason: HOSPADM

## 2018-09-25 RX ORDER — IBUPROFEN 200 MG
600 TABLET ORAL
Status: DISCONTINUED | OUTPATIENT
Start: 2018-09-25 | End: 2018-09-25 | Stop reason: HOSPADM

## 2018-09-25 RX ORDER — IBUPROFEN 600 MG/1
600 TABLET, FILM COATED ORAL EVERY 6 HOURS PRN
Qty: 30 TABLET | Refills: 0 | Status: SHIPPED | OUTPATIENT
Start: 2018-09-25 | End: 2019-07-11

## 2018-09-25 RX ORDER — MEPERIDINE HYDROCHLORIDE 25 MG/ML
12.5 INJECTION INTRAMUSCULAR; INTRAVENOUS; SUBCUTANEOUS
Status: DISCONTINUED | OUTPATIENT
Start: 2018-09-25 | End: 2018-09-25 | Stop reason: HOSPADM

## 2018-09-25 RX ORDER — SODIUM CHLORIDE, SODIUM LACTATE, POTASSIUM CHLORIDE, CALCIUM CHLORIDE 600; 310; 30; 20 MG/100ML; MG/100ML; MG/100ML; MG/100ML
INJECTION, SOLUTION INTRAVENOUS CONTINUOUS
Status: DISCONTINUED | OUTPATIENT
Start: 2018-09-25 | End: 2018-09-25 | Stop reason: HOSPADM

## 2018-09-25 RX ORDER — FENTANYL CITRATE 50 UG/ML
INJECTION, SOLUTION INTRAMUSCULAR; INTRAVENOUS PRN
Status: DISCONTINUED | OUTPATIENT
Start: 2018-09-25 | End: 2018-09-25

## 2018-09-25 RX ORDER — CEFAZOLIN SODIUM 2 G/100ML
2 INJECTION, SOLUTION INTRAVENOUS
Status: COMPLETED | OUTPATIENT
Start: 2018-09-25 | End: 2018-09-25

## 2018-09-25 RX ORDER — PROPOFOL 10 MG/ML
INJECTION, EMULSION INTRAVENOUS CONTINUOUS PRN
Status: DISCONTINUED | OUTPATIENT
Start: 2018-09-25 | End: 2018-09-25

## 2018-09-25 RX ORDER — ONDANSETRON 2 MG/ML
INJECTION INTRAMUSCULAR; INTRAVENOUS PRN
Status: DISCONTINUED | OUTPATIENT
Start: 2018-09-25 | End: 2018-09-25

## 2018-09-25 RX ORDER — PHENAZOPYRIDINE HYDROCHLORIDE 200 MG/1
200 TABLET, FILM COATED ORAL ONCE
Status: DISCONTINUED | OUTPATIENT
Start: 2018-09-25 | End: 2018-09-25 | Stop reason: HOSPADM

## 2018-09-25 RX ORDER — BUPIVACAINE HYDROCHLORIDE AND EPINEPHRINE 2.5; 5 MG/ML; UG/ML
INJECTION, SOLUTION INFILTRATION; PERINEURAL PRN
Status: DISCONTINUED | OUTPATIENT
Start: 2018-09-25 | End: 2018-09-25 | Stop reason: HOSPADM

## 2018-09-25 RX ORDER — OXYCODONE HCL 10 MG/1
10 TABLET, FILM COATED, EXTENDED RELEASE ORAL ONCE
Status: COMPLETED | OUTPATIENT
Start: 2018-09-25 | End: 2018-09-25

## 2018-09-25 RX ORDER — AMOXICILLIN 250 MG
1-2 CAPSULE ORAL 2 TIMES DAILY
Qty: 30 TABLET | Refills: 0 | Status: SHIPPED | OUTPATIENT
Start: 2018-09-25 | End: 2018-10-12

## 2018-09-25 RX ORDER — CEFAZOLIN SODIUM 2 G/100ML
2 INJECTION, SOLUTION INTRAVENOUS
Status: DISCONTINUED | OUTPATIENT
Start: 2018-09-25 | End: 2018-09-25 | Stop reason: HOSPADM

## 2018-09-25 RX ORDER — DEXAMETHASONE SODIUM PHOSPHATE 4 MG/ML
INJECTION, SOLUTION INTRA-ARTICULAR; INTRALESIONAL; INTRAMUSCULAR; INTRAVENOUS; SOFT TISSUE PRN
Status: DISCONTINUED | OUTPATIENT
Start: 2018-09-25 | End: 2018-09-25

## 2018-09-25 RX ORDER — EPHEDRINE SULFATE 50 MG/ML
INJECTION, SOLUTION INTRAMUSCULAR; INTRAVENOUS; SUBCUTANEOUS PRN
Status: DISCONTINUED | OUTPATIENT
Start: 2018-09-25 | End: 2018-09-25

## 2018-09-25 RX ORDER — ONDANSETRON 4 MG/1
4-8 TABLET, ORALLY DISINTEGRATING ORAL EVERY 8 HOURS PRN
Qty: 4 TABLET | Refills: 0 | Status: SHIPPED | OUTPATIENT
Start: 2018-09-25 | End: 2019-07-11

## 2018-09-25 RX ORDER — ONDANSETRON 4 MG/1
4 TABLET, ORALLY DISINTEGRATING ORAL
Status: DISCONTINUED | OUTPATIENT
Start: 2018-09-25 | End: 2018-09-25 | Stop reason: HOSPADM

## 2018-09-25 RX ORDER — ONDANSETRON 4 MG/1
4 TABLET, ORALLY DISINTEGRATING ORAL EVERY 30 MIN PRN
Status: DISCONTINUED | OUTPATIENT
Start: 2018-09-25 | End: 2018-09-25 | Stop reason: HOSPADM

## 2018-09-25 RX ORDER — OXYCODONE AND ACETAMINOPHEN 5; 325 MG/1; MG/1
1 TABLET ORAL EVERY 4 HOURS PRN
Status: DISCONTINUED | OUTPATIENT
Start: 2018-09-25 | End: 2018-09-25 | Stop reason: HOSPADM

## 2018-09-25 RX ORDER — FENTANYL CITRATE 50 UG/ML
25-50 INJECTION, SOLUTION INTRAMUSCULAR; INTRAVENOUS
Status: DISCONTINUED | OUTPATIENT
Start: 2018-09-25 | End: 2018-09-25 | Stop reason: HOSPADM

## 2018-09-25 RX ORDER — OXYCODONE AND ACETAMINOPHEN 5; 325 MG/1; MG/1
1 TABLET ORAL EVERY 4 HOURS PRN
Qty: 20 TABLET | Refills: 0 | Status: SHIPPED | OUTPATIENT
Start: 2018-09-25 | End: 2019-07-11

## 2018-09-25 RX ORDER — HYDROXYZINE HYDROCHLORIDE 25 MG/1
25 TABLET, FILM COATED ORAL
Status: DISCONTINUED | OUTPATIENT
Start: 2018-09-25 | End: 2018-09-25 | Stop reason: HOSPADM

## 2018-09-25 RX ADMIN — CEFAZOLIN SODIUM 2 G: 2 INJECTION, SOLUTION INTRAVENOUS at 07:33

## 2018-09-25 RX ADMIN — FENTANYL CITRATE 25 MCG: 50 INJECTION, SOLUTION INTRAMUSCULAR; INTRAVENOUS at 08:10

## 2018-09-25 RX ADMIN — FENTANYL CITRATE 25 MCG: 50 INJECTION, SOLUTION INTRAMUSCULAR; INTRAVENOUS at 08:01

## 2018-09-25 RX ADMIN — FENTANYL CITRATE 50 MCG: 50 INJECTION, SOLUTION INTRAMUSCULAR; INTRAVENOUS at 07:33

## 2018-09-25 RX ADMIN — FENTANYL CITRATE 50 MCG: 50 INJECTION, SOLUTION INTRAMUSCULAR; INTRAVENOUS at 07:35

## 2018-09-25 RX ADMIN — KETOROLAC TROMETHAMINE 30 MG: 30 INJECTION, SOLUTION INTRAMUSCULAR at 08:21

## 2018-09-25 RX ADMIN — PROPOFOL 150 MCG/KG/MIN: 10 INJECTION, EMULSION INTRAVENOUS at 07:39

## 2018-09-25 RX ADMIN — SODIUM CHLORIDE, POTASSIUM CHLORIDE, SODIUM LACTATE AND CALCIUM CHLORIDE: 600; 310; 30; 20 INJECTION, SOLUTION INTRAVENOUS at 09:09

## 2018-09-25 RX ADMIN — OXYCODONE HYDROCHLORIDE 10 MG: 10 TABLET, FILM COATED, EXTENDED RELEASE ORAL at 07:05

## 2018-09-25 RX ADMIN — FENTANYL CITRATE 25 MCG: 50 INJECTION, SOLUTION INTRAMUSCULAR; INTRAVENOUS at 08:17

## 2018-09-25 RX ADMIN — LIDOCAINE HYDROCHLORIDE 1 ML: 10 INJECTION, SOLUTION EPIDURAL; INFILTRATION; INTRACAUDAL; PERINEURAL at 07:05

## 2018-09-25 RX ADMIN — LIDOCAINE HYDROCHLORIDE 50 MG: 10 INJECTION, SOLUTION INFILTRATION; PERINEURAL at 07:39

## 2018-09-25 RX ADMIN — PHENAZOPYRIDINE HYDROCHLORIDE 200 MG: 200 TABLET, FILM COATED ORAL at 07:05

## 2018-09-25 RX ADMIN — FENTANYL CITRATE 25 MCG: 50 INJECTION, SOLUTION INTRAMUSCULAR; INTRAVENOUS at 08:07

## 2018-09-25 RX ADMIN — MIDAZOLAM 2 MG: 1 INJECTION INTRAMUSCULAR; INTRAVENOUS at 07:33

## 2018-09-25 RX ADMIN — VASOPRESSIN 10 ML: 20 INJECTION INTRAVENOUS at 08:06

## 2018-09-25 RX ADMIN — SODIUM CHLORIDE, POTASSIUM CHLORIDE, SODIUM LACTATE AND CALCIUM CHLORIDE: 600; 310; 30; 20 INJECTION, SOLUTION INTRAVENOUS at 07:05

## 2018-09-25 RX ADMIN — DEXAMETHASONE SODIUM PHOSPHATE 6 MG: 4 INJECTION, SOLUTION INTRA-ARTICULAR; INTRALESIONAL; INTRAMUSCULAR; INTRAVENOUS; SOFT TISSUE at 07:39

## 2018-09-25 RX ADMIN — Medication 5 MG: at 08:16

## 2018-09-25 RX ADMIN — BUPIVACAINE HYDROCHLORIDE AND EPINEPHRINE BITARTRATE 10 ML: 2.5; .005 INJECTION, SOLUTION INFILTRATION; PERINEURAL at 08:00

## 2018-09-25 RX ADMIN — Medication 10 MG: at 08:21

## 2018-09-25 RX ADMIN — ONDANSETRON 4 MG: 2 INJECTION INTRAMUSCULAR; INTRAVENOUS at 07:39

## 2018-09-25 NOTE — DISCHARGE INSTRUCTIONS
Same Day Surgery Discharge Instructions  Special Precautions After Surgery - Adult    1. It is not unusual to feel lightheaded or faint, up to 24 hours after surgery or while taking pain medication.  If you have these symptoms; sit for a few minutes before standing and have someone assist you when getting up.  2. You should rest and relax for the next 24 hours and must have someone stay with you for at least 24 hours after your discharge.  3. DO NOT DRIVE any vehicle or operate mechanical equipment for 24 hours following the end of your surgery.  DO NOT DRIVE while taking narcotic pain medications that have been prescribed by your physician.  If you had a limb operated on, you must be able to use it fully to drive.  4. DO NOT drink alcoholic beverages for 24 hours following surgery or while taking prescription pain medication.  5. Drink clear liquids (apple juice, ginger ale, broth, 7-Up, etc.).  Progress to your regular diet as you feel able.  6. Any questions call your physician and do not make important decisions for 24 hours.        __________________________________________________________________________________________________________________________________  IMPORTANT NUMBERS:    [facility] Main Number:  [phone], [phone]  Pharmacy:  [phone]  Same Day Surgery:  [phone], Monday - Friday until 8:30 p.m.  Urgent Care:  [phone]  Emergency Room:  [phone]      [facility]:  [phone]                                                                             [facility]:  [phone] option 1  [facility]:  [phone]     OB Clinic:  [phone]   Surgery Specialty Clinic:  [phone]   Home Medical Equipment: [phone]  [facility]:  [phone]

## 2018-09-25 NOTE — OR NURSING
Patient states has factor V.  Talked with Dr Holcomb and patient is to continue to take her daily aspirin as per home per Dr Holcomb.  Patient and  aware.

## 2018-09-25 NOTE — BRIEF OP NOTE
[facility] Gynecology Brief Operative Note    Pre-operative diagnosis: tighten vaginal walls to treat prolapse   Post-operative diagnosis: Same   Procedure: Anterior and Posterior vaginal repair   Surgeon: Julienne Holcomb MD   Assistant(s): Shakeel   Anesthesia: MAC (monitored anesthesia care)   Estimated blood loss: less than 50ml   Total IV fluids: (See anesthesia record)   Blood transfusion: No transfusion was given during surgery   Total urine output: (See anesthesia record)   Drains: None   Specimens: None   Findings:    Complications: None   Condition: Stable   Comments: See dictated operative report for full details

## 2018-09-25 NOTE — OP NOTE
Procedure Date: 09/25/2018      DATE OF PROCEDURE:  09/25/2018      PREOPERATIVE DIAGNOSIS:  Vaginal vault prolapse.      POSTOPERATIVE DIAGNOSIS:  Vaginal vault prolapse.      PROCEDURE:  Anterior and posterior vaginal repair.      SURGEON:  Julienne Holcomb MD      ASSISTANT:  Sophia Felipe MD      Surgical assistant was vital for retraction, hemostasis and closure.      ANESTHESIA:  MAC.      FINDINGS:  The presence of a cystocele and distal rectocele.  The uterus had minimal decensus with retraction.      PROCEDURE NOTE:  After assuring informed consent, the patient was taken to the operating suite where MAC anesthetic was initiated.  The patient was placed in the dorsal lithotomy position.  The vagina and perineum were sterilely prepped and draped.  A timeout for our safety assessment was performed.  Cintron catheter was placed in the bladder for drainage.  A speculum was placed within the vagina.  The cervix was grasped with Allis clamps and its support was assessed.  It was deemed adequate.  The vaginal wall was then grasped distal to the cervix along the anterior wall and undermined with a 50% solution of 0.25% Marcaine solution with epinephrine and diluted Pitressin.  It was then incised longitudinally up towards the region of the mid urethra.  The vesicovaginal plane was then developed with sharp and blunt dissection bilaterally, and the vesical and vaginal fascia was plicated across the midline with interrupted figure-of-eight suture with excellent reduction of the cystocele.  The excess vaginal mucosa was then trimmed and the vaginal walls reapproximated in a running locking fashion with Vicryl suture.  There was excellent elevation of the anterior vaginal wall at this time.  The introitus was then grasped at the 4 and 7 o'clock positions with 2 Allis clamps and the skin and mucosa were then undermined with the same mix of Marcaine, epinephrine and Pitressin.  A real-shaped piece of mucosa and  skin was then excised across the perineum.  The midline vaginal mucosa overlying the rectocele was then undermined with the solution with Metzenbaum scissors and then incised overlying the rectocele.  The rectovaginal plane was then developed with blunt and sharp dissection and the rectovaginal fascia plicated across the midline with excellent support provided over the existing rectocele.  The excess vaginal mucosa was then trimmed.  The vaginal defect reapproximated in a running locking fashion with Vicryl suture.  The peritoneum was reapproximated in a subcutaneous fashion and in a subcuticular fashion reapproximating the skin.  At this time only minimal bleeding was noted from the incision lines.  The urine was clear and the Cintron catheter was removed.  The patient was awakened and taken to postanesthesia recovery in stable condition.      ESTIMATED BLOOD LOSS:  5 mL.      SPECIMENS TO LAB:  None.         ZACK STRATTON MD             D: 2018   T: 2018   MT: JAJA      Name:     LIONEL TORRES   MRN:      [id number]        Account:        [id number]   :      1955           Procedure Date: 2018      Document: [id number]

## 2018-09-25 NOTE — ANESTHESIA PREPROCEDURE EVALUATION
Anesthesia Evaluation     . Pt has had prior anesthetic. Type: MAC and General    No history of anesthetic complications          ROS/MED HX    ENT/Pulmonary:  - neg pulmonary ROS     Neurologic:  - neg neurologic ROS     Cardiovascular:     (+) Dyslipidemia, ----. : . . . :. .       METS/Exercise Tolerance:  >4 METS   Hematologic:     (+) Other Hematologic Disorder-factor V leiden      Musculoskeletal:  - neg musculoskeletal ROS       GI/Hepatic:  - neg GI/hepatic ROS       Renal/Genitourinary:  - ROS Renal section negative       Endo:     (+) thyroid problem (thyroid nodule) .      Psychiatric:     (+) psychiatric history bipolar      Infectious Disease:  - neg infectious disease ROS       Malignancy:      - no malignancy   Other:                     Physical Exam  Normal systems: cardiovascular, pulmonary and dental    Airway   Mallampati: I  TM distance: >3 FB  Neck ROM: full    Dental     Cardiovascular       Pulmonary                     Anesthesia Plan      History & Physical Review  History and physical reviewed and following examination; no interval change.    ASA Status:  2 .    NPO Status:  > 8 hours    Plan for MAC, General and LMA Reason for MAC:  Deep or markedly invasive procedure (G8)  PONV prophylaxis:  Ondansetron (or other 5HT-3) and Dexamethasone or Solumedrol       Postoperative Care  Postoperative pain management:  IV analgesics and Oral pain medications.      Consents  Anesthetic plan, risks, benefits and alternatives discussed with:  Patient and Spouse.  Use of blood products discussed: No .   .                          .

## 2018-09-25 NOTE — ANESTHESIA POSTPROCEDURE EVALUATION
Patient: Gilda Petit    Procedure(s):  Anterior and Posterior Vaginal Repair - Wound Class: II-Clean Contaminated    Diagnosis:tighten vaginal walls to treat prolapse  Diagnosis Additional Information: No value filed.    Anesthesia Type:  No value filed.    Note:  Anesthesia Post Evaluation    Patient location during evaluation: Phase 2 and Bedside  Patient participation: Able to fully participate in evaluation  Level of consciousness: awake and alert  Pain management: adequate  Airway patency: patent  Cardiovascular status: acceptable  Respiratory status: acceptable  Hydration status: acceptable  PONV: none     Anesthetic complications: None          Last vitals:  Vitals:    09/25/18 0859 09/25/18 0900 09/25/18 0915   BP: 98/45 97/44 97/41   Pulse:      Resp: 16 16 16   Temp: 36.2  C (97.2  F)     SpO2: 96% 97% 98%         Electronically Signed By: SUMMER Link CRNA  September 25, 2018  9:33 AM

## 2018-09-25 NOTE — IP AVS SNAPSHOT
[facility] [place]    [address] [address]    Phone:  [phone]                                       After Visit Summary   9/25/2018    Gilda Petit    MRN: [id number]           After Visit Summary Signature Page     I have received my discharge instructions, and my questions have been answered. I have discussed any challenges I see with this plan with the nurse or doctor.    ..........................................................................................................................................  Patient/Patient Representative Signature      ..........................................................................................................................................  Patient Representative Print Name and Relationship to Patient    ..................................................               ................................................  Date                                   Time    ..........................................................................................................................................  Reviewed by Signature/Title    ...................................................              ..............................................  Date                                               Time          22EPIC Rev 08/18

## 2018-09-25 NOTE — ANESTHESIA CARE TRANSFER NOTE
Patient: Gilda Petit    Procedure(s):  Anterior and Posterior Vaginal Repair - Wound Class: II-Clean Contaminated    Diagnosis: tighten vaginal walls to treat prolapse  Diagnosis Additional Information: No value filed.    Anesthesia Type:   No value filed.     Note:  Airway :Face Mask  Patient transferred to:Phase II  Comments: Patient's VSS. Spontaneous respirations. Patient awake and oriented. IV patent. Report to RN.Handoff Report: Identifed the Patient, Identified the Reponsible Provider, Reviewed the pertinent medical history, Discussed the surgical course, Reviewed Intra-OP anesthesia mangement and issues during anesthesia, Set expectations for post-procedure period and Allowed opportunity for questions and acknowledgement of understanding      Vitals: (Last set prior to Anesthesia Care Transfer)    CRNA VITALS  9/25/2018 0756 - 9/25/2018 0827      9/25/2018             Pulse: 69    SpO2: 100 %    Resp Rate (observed): 9                Electronically Signed By: SUMMER Link CRNA  September 25, 2018  8:27 AM

## 2018-09-25 NOTE — IP AVS SNAPSHOT
MRN:[id number]                      After Visit Summary   9/25/2018    Gilda Petit    MRN: [id number]           Thank you!     Thank you for choosing [facility] for your care. Our goal is always to provide you with excellent care. Hearing back from our patients is one way we can continue to improve our services. Please take a few minutes to complete the written survey that you may receive in the mail after you visit with us. Thank you!        Patient Information     Date Of Birth          1955        About your hospital stay     You were admitted on:  September 25, 2018 You last received care in the:  [facility] PACU    You were discharged on:  September 25, 2018       Who to Call     For medical emergencies, please call 911.  For non-urgent questions about your medical care, please call your primary care provider or clinic, [phone]  For questions related to your surgery, please call your surgery clinic        Attending Provider     Provider Specialty    Julienne Holcomb MD OB/Gyn       Primary Care Provider Office Phone # Fax #    Gris Uribe [phone]      After Care Instructions     Discharge Instructions       Resume pre procedure diet            Discharge Instructions       Pelvic Rest. No tampons, douching or intercourse for  6  weeks.            Discharge Instructions       Patient to arrange follow up appointment in 2  weeks            No alcohol       NO ALCOHOL for 24 hours post procedure            No driving or operating machinery       No driving or operating machinery until day after procedure            No lifting       No lifting over 10 pounds and no strenuous physical activity.  For 6 weeks                  Further instructions from your care team                           Same Day Surgery Discharge Instructions  Special Precautions After Surgery - Adult    1. It is not unusual to feel lightheaded or faint, up to 24 hours after  surgery or while taking pain medication.  If you have these symptoms; sit for a few minutes before standing and have someone assist you when getting up.  2. You should rest and relax for the next 24 hours and must have someone stay with you for at least 24 hours after your discharge.  3. DO NOT DRIVE any vehicle or operate mechanical equipment for 24 hours following the end of your surgery.  DO NOT DRIVE while taking narcotic pain medications that have been prescribed by your physician.  If you had a limb operated on, you must be able to use it fully to drive.  4. DO NOT drink alcoholic beverages for 24 hours following surgery or while taking prescription pain medication.  5. Drink clear liquids (apple juice, ginger ale, broth, 7-Up, etc.).  Progress to your regular diet as you feel able.  6. Any questions call your physician and do not make important decisions for 24 hours.        __________________________________________________________________________________________________________________________________  IMPORTANT NUMBERS:    [facility] Main Number:  [phone], [phone]  Pharmacy:  [phone]  Same Day Surgery:  [phone], Monday - Friday until 8:30 p.m.  Urgent Care:  [phone]  Emergency Room:  [phone]      [facility]:  [phone]                                                                             [facility]:  [phone] option 1  [facility]:  [phone]     OB Clinic:  [phone]   Surgery Specialty Clinic:  [phone]   Home Medical Equipment: [phone]  [facility]:  [phone]        Pending Results     No orders found from 9/23/2018 to 9/26/2018.            Admission Information     Date & Time Provider Department Dept. Phone    9/25/2018 Julienne Holcomb MD [facility] PACU [phone]      Your Vitals Were     Blood Pressure Pulse Temperature Respirations Height Weight    97/41 80 97.2  F (36.2  " C) (Oral) 16 1.562 m (5' 1.5\") 56.2 kg (124 lb)    Pulse Oximetry BMI (Body Mass Index)                98% 23.05 kg/m2          [email or web address] Information     [employer] gives you secure access to your electronic health record. If you see a primary care provider, you can also send messages to your care team and make appointments. If you have questions, please call your primary care clinic.  If you do not have a primary care provider, please call [phone] and they will assist you.        Care EveryWhere ID     This is your Care EveryWhere ID. This could be used by other organizations to access your [facility] medical records  [id number]        Equal Access to Services     LENNY RAMOS : Matthew Keller, olga willard, temitope barragan, kendall medrano. So [facility] [phone].    ATENCIÓN: Si habla español, tiene a vasquez disposición servicios gratuitos de asistencia lingüística. Llame al [phone].    We comply with applicable federal civil rights laws and [place] laws. We do not discriminate on the basis of race, color, national origin, age, disability, sex, sexual orientation, or gender identity.               Review of your medicines      START taking        Dose / Directions    ibuprofen 600 MG tablet   Commonly known as:  ADVIL/MOTRIN   Used for:  Postoperative state        Dose:  600 mg   Take 1 tablet (600 mg) by mouth every 6 hours as needed for pain (mild)   Quantity:  30 tablet   Refills:  0       ondansetron 4 MG ODT tab   Commonly known as:  ZOFRAN-ODT   Used for:  Postoperative state        Dose:  4-8 mg   Take 1-2 tablets (4-8 mg) by mouth every 8 hours as needed for nausea Dissolve ON the tongue.   Quantity:  4 tablet   Refills:  0       oxyCODONE-acetaminophen 5-325 MG per tablet   Commonly known as:  PERCOCET   Used for:  Postoperative state        Dose:  1 tablet   Take 1 tablet by mouth every 4 hours as needed for pain (moderate to severe)   Quantity:  " 20 tablet   Refills:  0       senna-docusate 8.6-50 MG per tablet   Commonly known as:  SENOKOT-S;PERICOLACE   Used for:  Postoperative state        Dose:  1-2 tablet   Take 1-2 tablets by mouth 2 times daily Take while on oral narcotics to prevent or treat constipation.   Quantity:  30 tablet   Refills:  0         CONTINUE these medicines which have NOT CHANGED        Dose / Directions    amitriptyline 25 MG tablet   Commonly known as:  ELAVIL   Used for:  Major depression in complete remission (H)        Dose:  100 mg   Take 4 tablets (100 mg) by mouth At Bedtime   Quantity:  360 tablet   Refills:  3       amoxicillin 500 MG capsule   Commonly known as:  AMOXIL   Used for:  Cystic acne        Dose:  500 mg   Take 1 capsule (500 mg) by mouth daily   Quantity:  90 capsule   Refills:  3       FISH OIL PO   Used for:  Vaginal wall prolapse without uterine prolapse        Refills:  0       simvastatin 40 MG tablet   Commonly known as:  ZOCOR   Used for:  Hyperlipidemia LDL goal <130        Dose:  40 mg   Take 1 tablet (40 mg) by mouth At Bedtime   Quantity:  90 tablet   Refills:  3            Where to get your medicines      These medications were sent to [facility] [place], [place] - [address]  [address] [address]     Phone:  [phone]     ibuprofen 600 MG tablet    ondansetron 4 MG ODT tab    senna-docusate 8.6-50 MG per tablet         Some of these will need a paper prescription and others can be bought over the counter. Ask your nurse if you have questions.     Bring a paper prescription for each of these medications     oxyCODONE-acetaminophen 5-325 MG per tablet                Protect others around you: Learn how to safely use, store and throw away your medicines at www.disposemymeds.org.        Information about OPIOIDS     PRESCRIPTION OPIOIDS: WHAT YOU NEED TO KNOW   We gave you an opioid (narcotic) pain medicine. It is important to manage your pain, but opioids are not  always the best choice. You should first try all the other options your care team gave you. Take this medicine for as short a time (and as few doses) as possible.    Some activities can increase your pain, such as bandage changes or therapy sessions. It may help to take your pain medicine 30 to 60 minutes before these activities. Reduce your stress by getting enough sleep, working on hobbies you enjoy and practicing relaxation or meditation. Talk to your care team about ways to manage your pain beyond prescription opioids.    These medicines have risks:    DO NOT drive when on new or higher doses of pain medicine. These medicines can affect your alertness and reaction times, and you could be arrested for driving under the influence (DUI). If you need to use opioids long-term, talk to your care team about driving.    DO NOT operate heavy machinery    DO NOT do any other dangerous activities while taking these medicines.    DO NOT drink any alcohol while taking these medicines.     If the opioid prescribed includes acetaminophen, DO NOT take with any other medicines that contain acetaminophen. Read all labels carefully. Look for the word  acetaminophen  or  Tylenol.  Ask your pharmacist if you have questions or are unsure.    You can get addicted to pain medicines, especially if you have a history of addiction (chemical, alcohol or substance dependence). Talk to your care team about ways to reduce this risk.    All opioids tend to cause constipation. Drink plenty of water and eat foods that have a lot of fiber, such as fruits, vegetables, prune juice, apple juice and high-fiber cereal. Take a laxative (Miralax, milk of magnesia, Colace, Senna) if you don t move your bowels at least every other day. Other side effects include upset stomach, sleepiness, dizziness, throwing up, tolerance (needing more of the medicine to have the same effect), physical dependence and slowed breathing.    Store your pills in a secure  place, locked if possible. We will not replace any lost or stolen medicine. If you don t finish your medicine, please throw away (dispose) as directed by your pharmacist. The [place] [employer]ution [employer] has more information about safe disposal: https://www.pca.[facility].[place].us/living-green/managing-unwanted-medications             Medication List: This is a list of all your medications and when to take them. Check marks below indicate your daily home schedule. Keep this list as a reference.      Medications           Morning Afternoon Evening Bedtime As Needed    amitriptyline 25 MG tablet   Commonly known as:  ELAVIL   Take 4 tablets (100 mg) by mouth At Bedtime                                amoxicillin 500 MG capsule   Commonly known as:  AMOXIL   Take 1 capsule (500 mg) by mouth daily                                FISH OIL PO                                ibuprofen 600 MG tablet   Commonly known as:  ADVIL/MOTRIN   Take 1 tablet (600 mg) by mouth every 6 hours as needed for pain (mild)                                ondansetron 4 MG ODT tab   Commonly known as:  ZOFRAN-ODT   Take 1-2 tablets (4-8 mg) by mouth every 8 hours as needed for nausea Dissolve ON the tongue.                                oxyCODONE-acetaminophen 5-325 MG per tablet   Commonly known as:  PERCOCET   Take 1 tablet by mouth every 4 hours as needed for pain (moderate to severe)                                senna-docusate 8.6-50 MG per tablet   Commonly known as:  SENOKOT-S;PERICOLACE   Take 1-2 tablets by mouth 2 times daily Take while on oral narcotics to prevent or treat constipation.                                simvastatin 40 MG tablet   Commonly known as:  ZOCOR   Take 1 tablet (40 mg) by mouth At Bedtime

## 2018-09-25 NOTE — H&P (VIEW-ONLY)
[facility]  [address]  [facility] [address]  [phone]  Dept: [phone]    PRE-OP EVALUATION:  Today's date: 2018    Gilda Petit (: 1955) presents for pre-operative evaluation assessment as requested by Dr. Holcomb.  She requires evaluation and anesthesia risk assessment prior to undergoing surgery/procedure for treatment of vaginal repair .    Proposed Surgery/ Procedure: COMBINED COLPORRHAPHY ANTERIOR, POSTERIOR  Date of Surgery/ Procedure: 18  Time of Surgery/ Procedure: 7:30 AM  Hospital/Surgical Facility: [facility]  Primary Physician: Gris Uribe  Type of Anesthesia Anticipated: General    Patient has a Health Care Directive or Living Will:  YES on file    1. NO - Do you have a history of heart attack, stroke, stent, bypass or surgery on an artery in the head, neck, heart or legs?  2. NO - Do you ever have any pain or discomfort in your chest?  3. NO - Do you have a history of  Heart Failure?  4. NO - Are you troubled by shortness of breath when: walking on the level, up a slight hill or at night?  5. NO - Do you currently have a cold, bronchitis or other respiratory infection?  6. NO - Do you have a cough, shortness of breath or wheezing?  7. NO - Do you sometimes get pains in the calves of your legs when you walk?  8. YES - Do you or anyone in your family have previous history of blood clots? Factor 5 in twin sister and brother and father  9. NO - Do you or does anyone in your family have a serious bleeding problem such as prolonged bleeding following surgeries or cuts?  10. NO - Have you ever had problems with anemia or been told to take iron pills?  11. NO - Have you had any abnormal blood loss such as black, tarry or bloody stools, or abnormal vaginal bleeding?  12. NO - Have you ever had a blood transfusion?  13. NO - Have you or any of your relatives ever had problems with anesthesia?  14. NO - Do you have sleep apnea, excessive snoring or  daytime drowsiness?  15. NO - Do you have any prosthetic heart valves?  16. NO - Do you have prosthetic joints?  17. NO - Is there any chance that you may be pregnant?      HPI:     HPI related to upcoming procedure:   Patient has had worsening symptoms of vaginal prolapse that failed conservative management.  The prolapse also now involves the rectum and she's finding both voiding and defecation problematic.       See problem list for active medical problems.  Problems all longstanding and stable, except as noted/documented.  See ROS for pertinent symptoms related to these conditions.                                                                                                                                                          .    MEDICAL HISTORY:     Patient Active Problem List    Diagnosis Date Noted     Advance Care Planning 12/12/2011     Priority: Medium     Advance Care Planning 11/1/2016: Receipt of ACP document:  Received: Health Care Directive which was witnessed or notarized on 11/1/16.  Document not previously scanned.  Validation form completed and sent with document to be scanned.  Code Status needs to be updated to reflect choices in most recent ACP document.  Confirmed/documented designated decision maker(s).  Added by Brittney Ramirez RN, Advance Care Planning Liaison.  Advance Care Planning 12/12/11: Patient has completed an Advance/Health Care Directive (HCD), to bring in copy to be scanned into Epic. Xiomara Rhoades         Thyroid nodule 05/09/2011     Priority: Medium     5/9/2011  Repeat us in 1 year-- multiple small nodules        Major depression in complete remission (H) 11/29/2010     Priority: Medium     HYPERLIPIDEMIA LDL GOAL <130 10/31/2010     Priority: Medium     Contact dermatitis and other eczema, due to unspecified cause      Priority: Medium     Perioral dermatitis       Primary hypercoagulable state (H)      Priority: Medium     Accent V [email or web address] ( Heterogygate)        Cystocele, midline 2005     Priority: Medium     With h/o incomplete voiding; taught to self cath; tried size 3 ring with support pessary in , partially improved her symptoms       Absence of menstruation 2004     Priority: Medium     Missed Menses        Past Medical History:   Diagnosis Date     Absence of menstruation 2004    Missed Menses     Bipolar disorder, unspecified     Ulices GRUBER [phone]-Mood disorder (not otherwise specified) consider bipolar, hypomania and not depression.     Contact dermatitis and other eczema, due to unspecified cause     Perioral dermatitis     Manic disorder, recurrent episode, moderate (H)     Mood disorder (not otherwise specified) consider bipolar, hypomania and not depression.     Primary hypercoagulable state (H)     Factory V Leiden ( Heterogygate)     Past Surgical History:   Procedure Laterality Date     COLONOSCOPY  05     COLONOSCOPY N/A 2016    Procedure: COLONOSCOPY;  Surgeon: Ehsan Hager MD;  Location: [facility] DILATION/CURETTAGE DIAG/THER NON OB      D & C     SURGICAL HISTORY OF -        x3     SURGICAL HISTORY OF -       Miscarriages x2     Current Outpatient Prescriptions   Medication Sig Dispense Refill     amitriptyline (ELAVIL) 25 MG tablet Take 4 tablets (100 mg) by mouth At Bedtime 360 tablet 3     amoxicillin (AMOXIL) 500 MG capsule Take 1 capsule (500 mg) by mouth daily 90 capsule 3     estradiol (VAGIFEM) 10 MCG TABS vaginal tablet Place 1 tab vaginally at bedtime daily for 5 days then twice a week until surgery 30 tablet 1     Omega-3 Fatty Acids (FISH OIL PO)        simvastatin (ZOCOR) 40 MG tablet Take 1 tablet (40 mg) by mouth At Bedtime 90 tablet 3     OTC products: None, except as noted above    Allergies   Allergen Reactions     Metal [Staples]      allergic to romario     Tetracycline Nausea     Adhesive Tape Rash      Latex Allergy: NO    Social History   Substance Use Topics     Smoking status:  "Never Smoker     Smokeless tobacco: Never Used     Alcohol use No     History   Drug Use No       REVIEW OF SYSTEMS:   Review Of Systems  Gen: no weight loss  Skin: negative  Eyes: negative  Ears/Nose/Throat: recent URI symptoms, mild swollen left anterior lymph node  Respiratory: No shortness of breath, dyspnea on exertion, cough, or hemoptysis  Cardiovascular: negative  Gastrointestinal: negative     Musculoskeletal: negative  Neurologic: negative  Psychiatric: negative  Hematologic/Lymphatic/Immunologic: positive for diaphoresis  Endocrine: negative      EXAM:   /56  Pulse 80  Temp 98.1  F (36.7  C) (Tympanic)  Resp 18  Ht 5' 1.5\" (1.562 m)  Wt 124 lb (56.2 kg)  SpO2 99%  Breastfeeding? No  BMI 23.05 kg/m2      Physical Exam   Constitutional: She is oriented to person, place, and time. She appears well-developed and well-nourished.   HENT:   Head: Normocephalic and atraumatic.   Eyes: Conjunctivae are normal. Pupils are equal, round, and reactive to light.   Neck: Neck supple.   Cardiovascular: Normal rate, regular rhythm and normal heart sounds.    Pulmonary/Chest: Effort normal and breath sounds normal.   Abdominal: Soft. She exhibits no mass.   Musculoskeletal: She exhibits no edema.   Neurological: She is alert and oriented to person, place, and time.   Skin: Skin is warm and dry.   Psychiatric: She has a normal mood and affect.   Nursing note and vitals reviewed.      DIAGNOSTICS:   No labs or EKG required for low risk surgery (cataract, skin procedure, breast biopsy, etc)      Patient requesting TSH today (due to diaphoresis)  Labs signed and held for surgery so will do BMP and CBC today.   No results for input(s): HGB, PLT, INR, NA, POTASSIUM, CR, A1C in the last 35887 hours.     IMPRESSION:   Reason for surgery/procedure: vaginal wall prolapse without uterine prolapse  Diagnosis/reason for consult: preoperative evaluation and medical risk assessment    The proposed surgical procedure is " considered INTERMEDIATE risk.    REVISED CARDIAC RISK INDEX  The patient has the following serious cardiovascular risks for perioperative complications such as (MI, PE, VFib and 3  AV Block):  No serious cardiac risks  INTERPRETATION: 0 risks: Class I (very low risk - 0.4% complication rate)    The patient has the following additional risks for perioperative complications:  No identified additional risks      ICD-10-CM    1. Preop general physical exam Z01.818    2. Need for prophylactic vaccination and inoculation against influenza Z23 FLU VACCINE, SPLIT VIRUS, IM (QUADRIVALENT) [03110]- >3 YRS     Vaccine Administration, Initial [27390]       RECOMMENDATIONS:         --Patient is to take all scheduled medications on the day of surgery EXCEPT for modifications listed below.    Anticoagulant or Antiplatelet Medication Use  ASPIRIN: Discontinue ASA 7-10 days prior to procedure to reduce bleeding risk.  It should be resumed post-operatively.        APPROVAL GIVEN to proceed with proposed procedure, without further diagnostic evaluation       Signed Electronically by: Gris Uribe MD    Copy of this evaluation report is provided to requesting physician.    [place] Preop Guidelines    Revised Cardiac Risk Index    Injectable Influenza Immunization Documentation    1.  Is the person to be vaccinated sick today?   No    2. Does the person to be vaccinated have an allergy to a component   of the vaccine?   No  Egg Allergy Algorithm Link    3. Has the person to be vaccinated ever had a serious reaction   to influenza vaccine in the past?   No    4. Has the person to be vaccinated ever had Guillain-Barré syndrome?   No    Form completed by Xiomara Rhoades MA

## 2018-10-12 ENCOUNTER — OFFICE VISIT (OUTPATIENT)
Dept: OBGYN | Facility: CLINIC | Age: 63
End: 2018-10-12
Payer: COMMERCIAL

## 2018-10-12 VITALS
BODY MASS INDEX: 22.82 KG/M2 | TEMPERATURE: 96 F | SYSTOLIC BLOOD PRESSURE: 119 MMHG | DIASTOLIC BLOOD PRESSURE: 59 MMHG | WEIGHT: 124 LBS | HEART RATE: 83 BPM | HEIGHT: 62 IN | RESPIRATION RATE: 18 BRPM

## 2018-10-12 DIAGNOSIS — Z98.890 POSTOPERATIVE STATE: ICD-10-CM

## 2018-10-12 PROCEDURE — 51798 US URINE CAPACITY MEASURE: CPT | Performed by: OBSTETRICS & GYNECOLOGY

## 2018-10-12 PROCEDURE — 99024 POSTOP FOLLOW-UP VISIT: CPT | Performed by: OBSTETRICS & GYNECOLOGY

## 2018-10-12 RX ORDER — AMOXICILLIN 250 MG
1-2 CAPSULE ORAL 2 TIMES DAILY
Qty: 30 TABLET | Refills: 0 | Status: SHIPPED | OUTPATIENT
Start: 2018-10-12 | End: 2019-07-11

## 2018-10-12 NOTE — LETTER
October 12, 2018      Gilda Petit  [address] [address]        To Whom It May Concern:    Gilda Petit was seen in our clinic. She may return to work 10/17/18 without restrictions.      Sincerely,        Julienne Holcomb MD

## 2018-10-12 NOTE — MR AVS SNAPSHOT
"              After Visit Summary   10/12/2018    Gilda Petit    MRN: [id number]           Patient Information     Date Of Birth          1955        Visit Information        Provider Department      10/12/2018 10:00 AM Julienne Holcomb MD [facility]        Today's Diagnoses     Postoperative state           Follow-ups after your visit        Who to contact     If you have questions or need follow up information about today's clinic visit or your schedule please contact [facility] directly at [phone].  Normal or non-critical lab and imaging results will be communicated to you by Aspectivahart, letter or phone within 4 business days after the clinic has received the results. If you do not hear from us within 7 days, please contact the clinic through [employer] or phone. If you have a critical or abnormal lab result, we will notify you by phone as soon as possible.  Submit refill requests through [employer] or call your pharmacy and they will forward the refill request to us. Please allow 3 business days for your refill to be completed.          Additional Information About Your Visit        MyChart Information     Enc[employer]ix gives you secure access to your electronic health record. If you see a primary care provider, you can also send messages to your care team and make appointments. If you have questions, please call your primary care clinic.  If you do not have a primary care provider, please call [phone] and they will assist you.        Care EveryWhere ID     This is your Care EveryWhere ID. This could be used by other organizations to access your [place] medical records  [id number]        Your Vitals Were     Pulse Temperature Respirations Height BMI (Body Mass Index)       83 96  F (35.6  C) (Tympanic) 18 5' 1.5\" (1.562 m) 23.05 kg/m2        Blood Pressure from Last 3 Encounters:   10/12/18 119/59   09/25/18 109/41   09/20/18 112/56    Weight from Last 3 Encounters: "   10/12/18 124 lb (56.2 kg)   09/25/18 124 lb (56.2 kg)   09/20/18 124 lb (56.2 kg)              We Performed the Following     MEASURE POST-VOID RESIDUAL URINE/BLADDER CAPACITY, US NON-IMAGING          Where to get your medicines      These medications were sent to [facility] [place] - [place], [place] - [address]  [address], [place] [address]     Phone:  [phone]     senna-docusate 8.6-50 MG per tablet          Primary Care Provider Office Phone # Fax #    Gris Uribe [phone] [address] [address]        Equal Access to Services     LENNY RAMOS : Hadii bal Keller, wanithyada lutobi, qaybta kaalmada ademichelle, kendall medrano. So [facility] [phone].    ATENCIÓN: Si habla español, tiene a vasuqez disposición servicios gratuitos de asistencia lingüística. Llame al [phone].    We comply with applicable federal civil rights laws and [place] laws. We do not discriminate on the basis of race, color, national origin, age, disability, sex, sexual orientation, or gender identity.            Thank you!     Thank you for choosing [facility]  for your care. Our goal is always to provide you with excellent care. Hearing back from our patients is one way we can continue to improve our services. Please take a few minutes to complete the written survey that you may receive in the mail after your visit with us. Thank you!             Your Updated Medication List - Protect others around you: Learn how to safely use, store and throw away your medicines at www.disposemymeds.org.          This list is accurate as of 10/12/18 10:18 AM.  Always use your most recent med list.                   Brand Name Dispense Instructions for use Diagnosis    amitriptyline 25 MG tablet    ELAVIL    360 tablet    Take 4 tablets (100 mg) by mouth At Bedtime    Major depression in complete remission (H)       amoxicillin 500 MG  capsule    AMOXIL    90 capsule    Take 1 capsule (500 mg) by mouth daily    Cystic acne       FISH OIL PO       Vaginal wall prolapse without uterine prolapse       ibuprofen 600 MG tablet    ADVIL/MOTRIN    30 tablet    Take 1 tablet (600 mg) by mouth every 6 hours as needed for pain (mild)    Postoperative state       ondansetron 4 MG ODT tab    ZOFRAN-ODT    4 tablet    Take 1-2 tablets (4-8 mg) by mouth every 8 hours as needed for nausea Dissolve ON the tongue.    Postoperative state       oxyCODONE-acetaminophen 5-325 MG per tablet    PERCOCET    20 tablet    Take 1 tablet by mouth every 4 hours as needed for pain (moderate to severe)    Postoperative state       senna-docusate 8.6-50 MG per tablet    SENOKOT-S;PERICOLACE    30 tablet    Take 1-2 tablets by mouth 2 times daily Take while on oral narcotics to prevent or treat constipation.    Postoperative state       simvastatin 40 MG tablet    ZOCOR    90 tablet    Take 1 tablet (40 mg) by mouth At Bedtime    Hyperlipidemia LDL goal <130

## 2018-10-12 NOTE — PROGRESS NOTES
"Gilda is a 63 year old female 2 weeks S/P A & P repair, complicated by no problems reported.  She is currently requiring nothing for pain management.  The pathology report showed n/a.  She is currently reporting scant vaginal drainage; emptying bladder well and no constipation on stool softener; would like to RTW 10/17.    Exam: /59 (BP Location: Right arm, Patient Position: Chair, Cuff Size: Adult Small)  Pulse 83  Temp 96  F (35.6  C) (Tympanic)  Resp 18  Ht 5' 1.5\" (1.562 m)  Wt 124 lb (56.2 kg)  BMI 23.05 kg/m2  vaginal walls well supported    Assessment:  Postop      Plan:  Refill Pericolace  RTW 10/17/18 without restriction (no lifting per pt)  fU 4 weeks  Julienne Holcomb MD  [facility]      "

## 2018-11-08 ENCOUNTER — OFFICE VISIT (OUTPATIENT)
Dept: OBGYN | Facility: CLINIC | Age: 63
End: 2018-11-08
Payer: COMMERCIAL

## 2018-11-08 VITALS
SYSTOLIC BLOOD PRESSURE: 126 MMHG | DIASTOLIC BLOOD PRESSURE: 65 MMHG | TEMPERATURE: 96.9 F | BODY MASS INDEX: 23 KG/M2 | WEIGHT: 125 LBS | HEIGHT: 62 IN | RESPIRATION RATE: 18 BRPM | HEART RATE: 84 BPM

## 2018-11-08 DIAGNOSIS — Z98.890 POSTOPERATIVE STATE: Primary | ICD-10-CM

## 2018-11-08 PROCEDURE — 99024 POSTOP FOLLOW-UP VISIT: CPT | Performed by: OBSTETRICS & GYNECOLOGY

## 2018-11-08 NOTE — MR AVS SNAPSHOT
"              After Visit Summary   11/8/2018    Gilda Petit    MRN: [id number]           Patient Information     Date Of Birth          1955        Visit Information        Provider Department      11/8/2018 11:00 AM Julienne Holcomb MD [facility]        Today's Diagnoses     Postoperative state    -  1       Follow-ups after your visit        Who to contact     If you have questions or need follow up information about today's clinic visit or your schedule please contact [facility] directly at [phone].  Normal or non-critical lab and imaging results will be communicated to you by LIFEmeehart, letter or phone within 4 business days after the clinic has received the results. If you do not hear from us within 7 days, please contact the clinic through [employer] or phone. If you have a critical or abnormal lab result, we will notify you by phone as soon as possible.  Submit refill requests through OnKure or call your pharmacy and they will forward the refill request to us. Please allow 3 business days for your refill to be completed.          Additional Information About Your Visit        MyChart Information     OnKure gives you secure access to your electronic health record. If you see a primary care provider, you can also send messages to your care team and make appointments. If you have questions, please call your primary care clinic.  If you do not have a primary care provider, please call [phone] and they will assist you.        Care EveryWhere ID     This is your Care EveryWhere ID. This could be used by other organizations to access your [place] medical records  [id number]        Your Vitals Were     Pulse Temperature Respirations Height BMI (Body Mass Index)       84 96.9  F (36.1  C) (Tympanic) 18 5' 1.5\" (1.562 m) 23.24 kg/m2        Blood Pressure from Last 3 Encounters:   11/08/18 126/65   10/12/18 119/59   09/25/18 109/41    Weight from Last 3 " Encounters:   11/08/18 125 lb (56.7 kg)   10/12/18 124 lb (56.2 kg)   09/25/18 124 lb (56.2 kg)              Today, you had the following     No orders found for display       Primary Care Provider Office Phone # Fax #    Gris Uribe [phone] [address]  [facility] [address]        Equal Access to Services     FREDERICK RAMOS : Hadii aad ku hadasho Soomaali, waaxda luqadaha, qaybta kaalmada adeegyada, waxay idiin hayaan adeeg jesus lamukund . So [facility] [phone].    ATENCIÓN: Si nba sewell, tiene a vasquez disposición servicios gratuitos de asistencia lingüística. Llame al [phone].    We comply with applicable federal civil rights laws and [place] laws. We do not discriminate on the basis of race, color, national origin, age, disability, sex, sexual orientation, or gender identity.            Thank you!     Thank you for choosing [facility]  for your care. Our goal is always to provide you with excellent care. Hearing back from our patients is one way we can continue to improve our services. Please take a few minutes to complete the written survey that you may receive in the mail after your visit with us. Thank you!             Your Updated Medication List - Protect others around you: Learn how to safely use, store and throw away your medicines at www.disposemymeds.org.          This list is accurate as of 11/8/18 11:14 AM.  Always use your most recent med list.                   Brand Name Dispense Instructions for use Diagnosis    amitriptyline 25 MG tablet    ELAVIL    360 tablet    Take 4 tablets (100 mg) by mouth At Bedtime    Major depression in complete remission (H)       amoxicillin 500 MG capsule    AMOXIL    90 capsule    Take 1 capsule (500 mg) by mouth daily    Cystic acne       FISH OIL PO       Vaginal wall prolapse without uterine prolapse       ibuprofen 600 MG tablet    ADVIL/MOTRIN    30 tablet    Take 1 tablet (600 mg) by mouth every 6 hours  as needed for pain (mild)    Postoperative state       ondansetron 4 MG ODT tab    ZOFRAN-ODT    4 tablet    Take 1-2 tablets (4-8 mg) by mouth every 8 hours as needed for nausea Dissolve ON the tongue.    Postoperative state       oxyCODONE-acetaminophen 5-325 MG per tablet    PERCOCET    20 tablet    Take 1 tablet by mouth every 4 hours as needed for pain (moderate to severe)    Postoperative state       senna-docusate 8.6-50 MG per tablet    SENOKOT-S;PERICOLACE    30 tablet    Take 1-2 tablets by mouth 2 times daily Take while on oral narcotics to prevent or treat constipation.    Postoperative state       simvastatin 40 MG tablet    ZOCOR    90 tablet    Take 1 tablet (40 mg) by mouth At Bedtime    Hyperlipidemia LDL goal <130

## 2018-11-08 NOTE — PROGRESS NOTES
"Gilda is a 63 year old female 6 weeks S/P A & P repair, complicated by no problems reported.  She is currently requiring nothing for pain management.  The pathology report showed n/a.  She is currently reporting no vaginal drainage or pain.    Exam: /65 (BP Location: Right arm, Patient Position: Chair, Cuff Size: Adult Small)  Pulse 84  Temp 96.9  F (36.1  C) (Tympanic)  Resp 18  Ht 5' 1.5\" (1.562 m)  Wt 125 lb (56.7 kg)  BMI 23.24 kg/m2  vaginal walls well supported and minimal granulation noted on left side wall distally    Assessment:  Postop      Plan:  May resume normal activities  Consider Estradiol 0.5mg tabs PV at bedtime twice a week  Julienne Holcomb MD  [facility]      "

## 2019-01-07 ENCOUNTER — ANCILLARY PROCEDURE (OUTPATIENT)
Dept: MAMMOGRAPHY | Facility: CLINIC | Age: 64
End: 2019-01-07
Attending: FAMILY MEDICINE
Payer: COMMERCIAL

## 2019-01-07 DIAGNOSIS — Z12.31 VISIT FOR SCREENING MAMMOGRAM: ICD-10-CM

## 2019-01-07 PROCEDURE — 77067 SCR MAMMO BI INCL CAD: CPT | Mod: TC

## 2019-07-11 ENCOUNTER — OFFICE VISIT (OUTPATIENT)
Dept: FAMILY MEDICINE | Facility: CLINIC | Age: 64
End: 2019-07-11
Payer: COMMERCIAL

## 2019-07-11 VITALS
RESPIRATION RATE: 18 BRPM | HEIGHT: 62 IN | DIASTOLIC BLOOD PRESSURE: 64 MMHG | SYSTOLIC BLOOD PRESSURE: 98 MMHG | WEIGHT: 124 LBS | HEART RATE: 82 BPM | OXYGEN SATURATION: 100 % | TEMPERATURE: 98.2 F | BODY MASS INDEX: 22.82 KG/M2

## 2019-07-11 DIAGNOSIS — L82.0 INFLAMED SEBORRHEIC KERATOSIS: ICD-10-CM

## 2019-07-11 DIAGNOSIS — E78.5 HYPERLIPIDEMIA LDL GOAL <130: ICD-10-CM

## 2019-07-11 DIAGNOSIS — F32.5 MAJOR DEPRESSION IN COMPLETE REMISSION (H): ICD-10-CM

## 2019-07-11 DIAGNOSIS — Z00.00 ROUTINE GENERAL MEDICAL EXAMINATION AT A HEALTH CARE FACILITY: Primary | ICD-10-CM

## 2019-07-11 DIAGNOSIS — L70.0 CYSTIC ACNE: ICD-10-CM

## 2019-07-11 LAB
CHOLEST SERPL-MCNC: 206 MG/DL
HDLC SERPL-MCNC: 89 MG/DL
LDLC SERPL CALC-MCNC: 106 MG/DL
NONHDLC SERPL-MCNC: 117 MG/DL
TRIGL SERPL-MCNC: 54 MG/DL

## 2019-07-11 PROCEDURE — 17110 DESTRUCTION B9 LES UP TO 14: CPT | Performed by: FAMILY MEDICINE

## 2019-07-11 PROCEDURE — 80061 LIPID PANEL: CPT | Performed by: FAMILY MEDICINE

## 2019-07-11 PROCEDURE — 99213 OFFICE O/P EST LOW 20 MIN: CPT | Mod: 25 | Performed by: FAMILY MEDICINE

## 2019-07-11 PROCEDURE — 99396 PREV VISIT EST AGE 40-64: CPT | Mod: 25 | Performed by: FAMILY MEDICINE

## 2019-07-11 PROCEDURE — 36415 COLL VENOUS BLD VENIPUNCTURE: CPT | Performed by: FAMILY MEDICINE

## 2019-07-11 RX ORDER — AMOXICILLIN 500 MG/1
500 CAPSULE ORAL DAILY
Qty: 90 CAPSULE | Refills: 1 | Status: SHIPPED | OUTPATIENT
Start: 2019-07-11 | End: 2020-04-14

## 2019-07-11 RX ORDER — AMITRIPTYLINE HYDROCHLORIDE 100 MG/1
100 TABLET ORAL AT BEDTIME
Qty: 90 TABLET | Refills: 3 | Status: SHIPPED | OUTPATIENT
Start: 2019-07-11 | End: 2020-07-31

## 2019-07-11 RX ORDER — SIMVASTATIN 40 MG
TABLET ORAL
Qty: 90 TABLET | Refills: 1 | Status: SHIPPED | OUTPATIENT
Start: 2019-07-11 | End: 2020-04-14

## 2019-07-11 ASSESSMENT — PATIENT HEALTH QUESTIONNAIRE - PHQ9
SUM OF ALL RESPONSES TO PHQ QUESTIONS 1-9: 0
5. POOR APPETITE OR OVEREATING: NOT AT ALL

## 2019-07-11 ASSESSMENT — ANXIETY QUESTIONNAIRES
3. WORRYING TOO MUCH ABOUT DIFFERENT THINGS: NOT AT ALL
6. BECOMING EASILY ANNOYED OR IRRITABLE: NOT AT ALL
7. FEELING AFRAID AS IF SOMETHING AWFUL MIGHT HAPPEN: NOT AT ALL
5. BEING SO RESTLESS THAT IT IS HARD TO SIT STILL: NOT AT ALL
1. FEELING NERVOUS, ANXIOUS, OR ON EDGE: NOT AT ALL
2. NOT BEING ABLE TO STOP OR CONTROL WORRYING: NOT AT ALL
GAD7 TOTAL SCORE: 0

## 2019-07-11 ASSESSMENT — PAIN SCALES - GENERAL: PAINLEVEL: NO PAIN (0)

## 2019-07-11 ASSESSMENT — MIFFLIN-ST. JEOR: SCORE: 1062.77

## 2019-07-11 NOTE — PROGRESS NOTES
Retired    SUBJECTIVE:   CC: Gilda Petit is an 64 year old woman who presents for preventive health visit.   Chief Complaint   Patient presents with     Physical     Derm Problem     Patient has a spot on left side of back that becomes irritated by her bra.      Depression     Would like to discuss increasing medication     Insect Bites     Patient was bit by a tick in the middle of toe X 1 month ago and she is concerned it is now itching.        Healthy Habits:    Do you get at least three servings of calcium containing foods daily (dairy, green leafy vegetables, etc.)? yes    Amount of exercise or daily activities, outside of work: 2 day(s) per week    Problems taking medications regularly No    Medication side effects: No    Have you had an eye exam in the past two years? yes    Do you see a dentist twice per year? yes    Do you have sleep apnea, excessive snoring or daytime drowsiness?no      Depression Followup    How are you doing with your depression since your last visit? Worsened     Are you having other symptoms that might be associated with depression? Yes:  anxiety    Have you had a significant life event?  OTHER: new lifestyle since retiring     Are you feeling anxious or having panic attacks?   Yes:  anxious    Do you have any concerns with your use of alcohol or other drugs? No     Feels like she needs a bit more and would like to stay with the amitriptyline as it's a bit sedating and helps with the anxiety.  Has been on 100 mg for quite some time.      Social History     Tobacco Use     Smoking status: Never Smoker     Smokeless tobacco: Never Used   Substance Use Topics     Alcohol use: No     Drug use: No     PHQ 10/26/2016 2/22/2018 9/20/2018   PHQ-9 Total Score 1 0 0   Q9: Thoughts of better off dead/self-harm past 2 weeks Not at all Not at all Not at all     MENDEZ-7 SCORE 12/12/2011 3/21/2013 10/26/2016   Total Score 0 0 -   Total Score - - 0 (minimal anxiety)   Total Score - - 0       In the  past two weeks have you had thoughts of suicide or self-harm?  No.    Do you have concerns about your personal safety or the safety of others?   No    Suicide Assessment Five-step Evaluation and Treatment (SAFE-T)    Today's PHQ-2 Score:   PHQ-2 ( 1999 Pfizer) 2/22/2018 12/22/2016   Q1: Little interest or pleasure in doing things 0 0   Q2: Feeling down, depressed or hopeless 0 0   PHQ-2 Score 0 0   Q1: Little interest or pleasure in doing things - -   Q2: Feeling down, depressed or hopeless - -   PHQ-2 Score - -       Abuse: Current or Past(Physical, Sexual or Emotional)- No  Do you feel safe in your environment? Yes    Social History     Tobacco Use     Smoking status: Never Smoker     Smokeless tobacco: Never Used   Substance Use Topics     Alcohol use: No     If you drink alcohol do you typically have >3 drinks per day or >7 drinks per week? No                     Reviewed orders with patient.  Reviewed health maintenance and updated orders accordingly - Yes  Lab work is in process    Mammogram Screening: Patient over age 50, mutual decision to screen reflected in health maintenance.    Pertinent mammograms are reviewed under the imaging tab.  History of abnormal Pap smear: NO - age 30-65 PAP every 5 years with negative HPV co-testing recommended  PAP / HPV Latest Ref Rng & Units 11/4/2015 3/21/2013 11/29/2010   PAP - NIL NIL NIL   HPV 16 DNA NEG Negative - -   HPV 18 DNA NEG Negative - -   OTHER HR HPV NEG Negative - -     Reviewed and updated as needed this visit by clinical staff  Tobacco  Allergies  Meds  Med Hx  Surg Hx  Fam Hx  Soc Hx        Reviewed and updated as needed this visit by Provider            ROS:  CONSTITUTIONAL: NEGATIVE for fever, chills, change in weight  INTEGUMENTARY/SKIN: mole on left mid back at braline that gets irritated with bra.  Itching between right 1st and 2nd toes after a tick bite with swelling.  Some flaking skin.   EYES: NEGATIVE for vision changes or irritation  ENT:  "NEGATIVE for ear, mouth and throat problems  RESP: NEGATIVE for significant cough or SOB  BREAST: NEGATIVE for masses, tenderness or discharge  CV: NEGATIVE for chest pain, palpitations or peripheral edema  GI: NEGATIVE for nausea, abdominal pain, heartburn, or change in bowel habits  : NEGATIVE for unusual urinary or vaginal symptoms. No vaginal bleeding.  MUSCULOSKELETAL: NEGATIVE for significant arthralgias or myalgia  NEURO: NEGATIVE for weakness, dizziness or paresthesias  PSYCHIATRIC: NEGATIVE for changes in mood or affect     OBJECTIVE:   BP 98/64   Pulse 82   Temp 98.2  F (36.8  C) (Tympanic)   Resp 18   Ht 1.562 m (5' 1.5\")   Wt 56.2 kg (124 lb)   SpO2 100%   Breastfeeding? No   BMI 23.05 kg/m    EXAM:  GENERAL: healthy, alert and no distress  EYES: Eyes grossly normal to inspection, PERRL and conjunctivae and sclerae normal  HENT: ear canals and TM's normal, nose and mouth without ulcers or lesions  NECK: no adenopathy, no asymmetry, masses, or scars and thyroid normal to palpation  RESP: lungs clear to auscultation - no rales, rhonchi or wheezes  BREAST: normal without masses, tenderness or nipple discharge and no palpable axillary masses or adenopathy  CV: regular rate and rhythm, normal S1 S2, no S3 or S4, no murmur, click or rub, no peripheral edema and peripheral pulses strong  ABDOMEN: soft, nontender, no hepatosplenomegaly, no masses and bowel sounds normal  MS: no gross musculoskeletal defects noted, no edema  SKIN: keratoses - seborrheic # 1 mid back - frozen x 3 with liquid nitrogen  Some flaking of skin between right 1st/2nd toes.  No infection, no foreign body noted  NEURO: Normal strength and tone, mentation intact and speech normal  PSYCH: mentation appears normal, affect normal/bright    Diagnostic Test Results:  Labs reviewed in Epic    ASSESSMENT/PLAN:   1. Routine general medical examination at a health care facility       2. Hyperlipidemia LDL goal <130     - Lipid panel reflex " "to direct LDL Fasting  - simvastatin (ZOCOR) 40 MG tablet; TAKE ONE TABLET BY MOUTH EVERY NIGHT AT BEDTIME  Dispense: 90 tablet; Refill: 1    3. Major depression in complete remission (H)   increase to 125 mg daily, patient will let me know after a few weeks how this higher dose is going  - amitriptyline (ELAVIL) 25 MG tablet; Take 1 tablet (25 mg) by mouth At Bedtime To take with 100 mg for total 125 mg daily  Dispense: 90 tablet; Refill: 3  - amitriptyline (ELAVIL) 100 MG tablet; Take 1 tablet (100 mg) by mouth At Bedtime Take with 25 mg pill for a total of 125 mg daily  Dispense: 90 tablet; Refill: 3    4. Cystic acne     - amoxicillin (AMOXIL) 500 MG capsule; Take 1 capsule (500 mg) by mouth daily  Dispense: 90 capsule; Refill: 1    5. Inflamed seborrheic keratosis   treated with liquid nitrogen  - DESTRUCT BENIGN LESION, UP TO 14    COUNSELING:   Reviewed preventive health counseling, as reflected in patient instructions       Regular exercise       Healthy diet/nutrition    Estimated body mass index is 23.05 kg/m  as calculated from the following:    Height as of this encounter: 1.562 m (5' 1.5\").    Weight as of this encounter: 56.2 kg (124 lb).         reports that she has never smoked. She has never used smokeless tobacco.      Counseling Resources:  ATP IV Guidelines  Pooled Cohorts Equation Calculator  Breast Cancer Risk Calculator  FRAX Risk Assessment  ICSI Preventive Guidelines  Dietary Guidelines for Americans, 2010  USDA's MyPlate  ASA Prophylaxis  Lung CA Screening    Gris Uribe MD  [facility]  "

## 2019-07-12 ASSESSMENT — ANXIETY QUESTIONNAIRES: GAD7 TOTAL SCORE: 0

## 2020-04-12 DIAGNOSIS — L70.0 CYSTIC ACNE: ICD-10-CM

## 2020-04-12 DIAGNOSIS — E78.5 HYPERLIPIDEMIA LDL GOAL <130: ICD-10-CM

## 2020-04-13 NOTE — TELEPHONE ENCOUNTER
"Requested Prescriptions   Pending Prescriptions Disp Refills     amoxicillin (AMOXIL) 500 MG capsule [Pharmacy Med Name: AMOXICILLIN 500MG CAPS] 90 capsule 1     Sig: TAKE ONE CAPSULE BY MOUTH ONCE DAILY       Oral Acne/Rosacea Medications Protocol Failed - 4/12/2020 12:31 PM        Failed - Confirmation of diagnosis is required     Please confirm diagnosis is acne or rosacea.     If NOT acne or rosacea; refer request to provider for further evaluation.    If diagnosis IS acne or rosacea, OK to refill BASED ON PREVIOUS REFILL CLINICAL NOTE RECOMMENDATION.          Passed - Patient is 12 years of age or older        Passed - Recent (12 mo) or future (30 days) visit within the authorizing provider's specialty     Patient has had an office visit with the authorizing provider or a provider within the authorizing providers department within the previous 12 mos or has a future within next 30 days. See \"Patient Info\" tab in inbasket, or \"Choose Columns\" in Meds & Orders section of the refill encounter.              Passed - Medication is active on med list        Passed - No active pregnancy on record        Passed - No positive prenancy test is past 12 months           simvastatin (ZOCOR) 40 MG tablet [Pharmacy Med Name: SIMVASTATIN 40MG TABS] 90 tablet 1     Sig: TAKE ONE TABLET BY MOUTH EVERY NIGHT AT BEDTIME       Statins Protocol Passed - 4/12/2020 12:31 PM        Passed - LDL on file in past 12 months     Recent Labs   Lab Test 07/11/19  1040   *             Passed - No abnormal creatine kinase in past 12 months     No lab results found.             Passed - Recent (12 mo) or future (30 days) visit within the authorizing provider's specialty     Patient has had an office visit with the authorizing provider or a provider within the authorizing providers department within the previous 12 mos or has a future within next 30 days. See \"Patient Info\" tab in inbasket, or \"Choose Columns\" in Meds & Orders section of " the refill encounter.              Passed - Medication is active on med list        Passed - Patient is age 18 or older        Passed - No active pregnancy on record        Passed - No positive pregnancy test in past 12 months           Both meds  Last Written Prescription Date:  7/11/2019  Last Fill Quantity: 90,  # refills: 1   Last office visit: 7/11/2019 with prescribing provider:  Rony    Future Office Visit:

## 2020-04-14 RX ORDER — AMOXICILLIN 500 MG/1
CAPSULE ORAL
Qty: 90 CAPSULE | Refills: 0 | Status: SHIPPED | OUTPATIENT
Start: 2020-04-14 | End: 2020-07-15

## 2020-04-14 RX ORDER — SIMVASTATIN 40 MG
TABLET ORAL
Qty: 90 TABLET | Refills: 0 | Status: SHIPPED | OUTPATIENT
Start: 2020-04-14 | End: 2020-07-15

## 2020-04-14 NOTE — TELEPHONE ENCOUNTER
Prescription approved per [facility] Refill Protocol.    Bruna Bermudez RN

## 2020-07-13 DIAGNOSIS — E78.5 HYPERLIPIDEMIA LDL GOAL <130: ICD-10-CM

## 2020-07-13 DIAGNOSIS — L70.0 CYSTIC ACNE: ICD-10-CM

## 2020-07-14 NOTE — TELEPHONE ENCOUNTER
Routing refill request to provider for review/approval because:  Patient needs to be seen because it has been more than 1 year since last office visit. Due for lab as well.      CINDA RiosN, RN

## 2020-07-14 NOTE — TELEPHONE ENCOUNTER
"Requested Prescriptions   Pending Prescriptions Disp Refills     amoxicillin (AMOXIL) 500 MG capsule [Pharmacy Med Name: AMOXICILLIN 500MG CAPS] 90 capsule 0     Sig: TAKE ONE CAPSULE BY MOUTH ONCE DAILY       Oral Acne/Rosacea Medications Protocol Failed - 7/13/2020 11:14 AM        Failed - Recent (12 mo) or future (30 days) visit within the authorizing provider's specialty     Patient has had an office visit with the authorizing provider or a provider within the authorizing providers department within the previous 12 mos or has a future within next 30 days. See \"Patient Info\" tab in inbasket, or \"Choose Columns\" in Meds & Orders section of the refill encounter.              Failed - Confirmation of diagnosis is required     Please confirm diagnosis is acne or rosacea.     If NOT acne or rosacea; refer request to provider for further evaluation.    If diagnosis IS acne or rosacea, OK to refill BASED ON PREVIOUS REFILL CLINICAL NOTE RECOMMENDATION.          Passed - Patient is 12 years of age or older        Passed - Medication is active on med list        Passed - No active pregnancy on record        Passed - No positive prenancy test is past 12 months      Last Written Prescription Date:  4/14/20  Last Fill Quantity: 90,  # refills: 0   Last office visit: 7/11/2019 with prescribing provider:  Rony   Future Office Visit:               simvastatin (ZOCOR) 40 MG tablet [Pharmacy Med Name: SIMVASTATIN 40MG TABS] 90 tablet 0     Sig: TAKE ONE TABLET BY MOUTH EVERY NIGHT AT BEDTIME       Statins Protocol Failed - 7/13/2020 11:14 AM        Failed - LDL on file in past 12 months     Recent Labs   Lab Test 07/11/19  1040   *             Failed - Recent (12 mo) or future (30 days) visit within the authorizing provider's specialty     Patient has had an office visit with the authorizing provider or a provider within the authorizing providers department within the previous 12 mos or has a future within next 30 " "days. See \"Patient Info\" tab in inbasket, or \"Choose Columns\" in Meds & Orders section of the refill encounter.              Passed - No abnormal creatine kinase in past 12 months     No lab results found.             Passed - Medication is active on med list        Passed - Patient is age 18 or older        Passed - No active pregnancy on record        Passed - No positive pregnancy test in past 12 months           e  "

## 2020-07-15 RX ORDER — SIMVASTATIN 40 MG
TABLET ORAL
Qty: 30 TABLET | Refills: 0 | Status: SHIPPED | OUTPATIENT
Start: 2020-07-15 | End: 2020-07-31

## 2020-07-15 RX ORDER — AMOXICILLIN 500 MG/1
CAPSULE ORAL
Qty: 30 CAPSULE | Refills: 0 | Status: SHIPPED | OUTPATIENT
Start: 2020-07-15 | End: 2020-07-31

## 2020-07-31 ENCOUNTER — OFFICE VISIT (OUTPATIENT)
Dept: FAMILY MEDICINE | Facility: CLINIC | Age: 65
End: 2020-07-31
Payer: COMMERCIAL

## 2020-07-31 VITALS
HEART RATE: 77 BPM | WEIGHT: 128 LBS | OXYGEN SATURATION: 97 % | TEMPERATURE: 98 F | RESPIRATION RATE: 16 BRPM | HEIGHT: 62 IN | DIASTOLIC BLOOD PRESSURE: 60 MMHG | BODY MASS INDEX: 23.55 KG/M2 | SYSTOLIC BLOOD PRESSURE: 110 MMHG

## 2020-07-31 DIAGNOSIS — F32.5 MAJOR DEPRESSION IN COMPLETE REMISSION (H): ICD-10-CM

## 2020-07-31 DIAGNOSIS — Z00.00 ROUTINE GENERAL MEDICAL EXAMINATION AT A HEALTH CARE FACILITY: Primary | ICD-10-CM

## 2020-07-31 DIAGNOSIS — Z12.4 SCREENING FOR CERVICAL CANCER: ICD-10-CM

## 2020-07-31 DIAGNOSIS — L70.0 CYSTIC ACNE: ICD-10-CM

## 2020-07-31 DIAGNOSIS — E78.5 HYPERLIPIDEMIA LDL GOAL <130: ICD-10-CM

## 2020-07-31 LAB
CHOLEST SERPL-MCNC: 199 MG/DL
GLUCOSE SERPL-MCNC: 87 MG/DL (ref 70–99)
HDLC SERPL-MCNC: 90 MG/DL
LDLC SERPL CALC-MCNC: 90 MG/DL
NONHDLC SERPL-MCNC: 109 MG/DL
TRIGL SERPL-MCNC: 95 MG/DL

## 2020-07-31 PROCEDURE — 80061 LIPID PANEL: CPT | Performed by: FAMILY MEDICINE

## 2020-07-31 PROCEDURE — 82947 ASSAY GLUCOSE BLOOD QUANT: CPT | Performed by: FAMILY MEDICINE

## 2020-07-31 PROCEDURE — 36415 COLL VENOUS BLD VENIPUNCTURE: CPT | Performed by: FAMILY MEDICINE

## 2020-07-31 PROCEDURE — 99396 PREV VISIT EST AGE 40-64: CPT | Performed by: FAMILY MEDICINE

## 2020-07-31 PROCEDURE — G0145 SCR C/V CYTO,THINLAYER,RESCR: HCPCS | Performed by: FAMILY MEDICINE

## 2020-07-31 PROCEDURE — 99213 OFFICE O/P EST LOW 20 MIN: CPT | Mod: 25 | Performed by: FAMILY MEDICINE

## 2020-07-31 PROCEDURE — 87624 HPV HI-RISK TYP POOLED RSLT: CPT | Performed by: FAMILY MEDICINE

## 2020-07-31 RX ORDER — AMOXICILLIN 500 MG/1
500 CAPSULE ORAL DAILY
Qty: 90 CAPSULE | Refills: 3 | Status: SHIPPED | OUTPATIENT
Start: 2020-07-31 | End: 2021-03-04

## 2020-07-31 RX ORDER — SIMVASTATIN 40 MG
40 TABLET ORAL AT BEDTIME
Qty: 90 TABLET | Refills: 3 | Status: SHIPPED | OUTPATIENT
Start: 2020-07-31 | End: 2021-03-04

## 2020-07-31 RX ORDER — AMITRIPTYLINE HYDROCHLORIDE 100 MG/1
100 TABLET ORAL AT BEDTIME
Qty: 90 TABLET | Refills: 3 | Status: CANCELLED | OUTPATIENT
Start: 2020-07-31

## 2020-07-31 ASSESSMENT — MIFFLIN-ST. JEOR: SCORE: 1075.91

## 2020-07-31 ASSESSMENT — PATIENT HEALTH QUESTIONNAIRE - PHQ9: SUM OF ALL RESPONSES TO PHQ QUESTIONS 1-9: 0

## 2020-07-31 NOTE — PROGRESS NOTES
SUBJECTIVE:   CC: Gilda Petit is an 65 year old woman who presents for preventive health visit.     Healthy Habits:    Do you get at least three servings of calcium containing foods daily (dairy, green leafy vegetables, etc.)? yes    Amount of exercise or daily activities, outside of work: 3 day(s) per week    Problems taking medications regularly No    Medication side effects: No    Have you had an eye exam in the past two years? yes    Do you see a dentist twice per year? yes    Do you have sleep apnea, excessive snoring or daytime drowsiness?no      Hyperlipidemia Follow-Up      Are you regularly taking any medication or supplement to lower your cholesterol?   Yes- Zocor    Are you having muscle aches or other side effects that you think could be caused by your cholesterol lowering medication?  No    Depression Followup    How are you doing with your depression since your last visit? No change    Are you having other symptoms that might be associated with depression? No    Have you had a significant life event?  Health Concerns Daughter is a nurse and has twins that are 1 and COVID concerns.    Are you feeling anxious or having panic attacks?   No    Do you have any concerns with your use of alcohol or other drugs? No     Moods stable, has been on the amitriptyline 125 mg daily for a long time.   Sometimes she will take only  mg if getting to bed late so that her morning isn't so groggy.    Social History     Tobacco Use     Smoking status: Never Smoker     Smokeless tobacco: Never Used   Substance Use Topics     Alcohol use: Yes     Comment: occ     Drug use: No     PHQ 2/22/2018 9/20/2018 7/11/2019   PHQ-9 Total Score 0 0 0   Q9: Thoughts of better off dead/self-harm past 2 weeks Not at all Not at all Not at all     MENDEZ-7 SCORE 3/21/2013 10/26/2016 7/11/2019   Total Score 0 - -   Total Score - 0 (minimal anxiety) -   Total Score - 0 0       In the past two weeks have you had thoughts of suicide or  self-harm?  No.    Do you have concerns about your personal safety or the safety of others?   No    Suicide Assessment Five-step Evaluation and Treatment (SAFE-T)      Today's PHQ-2 Score:   PHQ-2 ( 1999 Pfizer) 2/22/2018 12/22/2016   Q1: Little interest or pleasure in doing things 0 0   Q2: Feeling down, depressed or hopeless 0 0   PHQ-2 Score 0 0   Q1: Little interest or pleasure in doing things - -   Q2: Feeling down, depressed or hopeless - -   PHQ-2 Score - -       Abuse: Current or Past(Physical, Sexual or Emotional)- No  Do you feel safe in your environment? Yes        Social History     Tobacco Use     Smoking status: Never Smoker     Smokeless tobacco: Never Used   Substance Use Topics     Alcohol use: Yes     Comment: occ     If you drink alcohol do you typically have >3 drinks per day or >7 drinks per week? No                     Reviewed orders with patient.  Reviewed health maintenance and updated orders accordingly - Yes      Mammogram Screening: Patient over age 50, mutual decision to screen reflected in health maintenance.    Pertinent mammograms are reviewed under the imaging tab.  History of abnormal Pap smear: NO - age 30-65 PAP every 5 years with negative HPV co-testing recommended  PAP / HPV Latest Ref Rng & Units 11/4/2015 3/21/2013 11/29/2010   PAP - NIL NIL NIL   HPV 16 DNA NEG Negative - -   HPV 18 DNA NEG Negative - -   OTHER HR HPV NEG Negative - -     Reviewed and updated as needed this visit by clinical staff  Tobacco  Allergies  Meds  Med Hx  Surg Hx  Fam Hx  Soc Hx        Reviewed and updated as needed this visit by Provider            ROS:  CONSTITUTIONAL: NEGATIVE for fever, chills, change in weight  INTEGUMENTARY/SKIN: NEGATIVE for worrisome rashes, moles or lesions  EYES: NEGATIVE for vision changes or irritation  ENT: NEGATIVE for ear, mouth and throat problems  RESP: NEGATIVE for significant cough or SOB  BREAST: NEGATIVE for masses, tenderness or discharge  CV: NEGATIVE  "for chest pain, palpitations or peripheral edema  GI: NEGATIVE for nausea, abdominal pain, heartburn, or change in bowel habits  : NEGATIVE for unusual urinary or vaginal symptoms. No vaginal bleeding.  MUSCULOSKELETAL: NEGATIVE for significant arthralgias or myalgia  NEURO: NEGATIVE for weakness, dizziness or paresthesias  PSYCHIATRIC: NEGATIVE for changes in mood or affect     OBJECTIVE:   /60 (BP Location: Left arm, Patient Position: Chair, Cuff Size: Adult Regular)   Pulse 77   Temp 98  F (36.7  C) (Tympanic)   Resp 16   Ht 1.562 m (5' 1.5\")   Wt 58.1 kg (128 lb)   SpO2 97%   BMI 23.79 kg/m    EXAM:  GENERAL: healthy, alert and no distress  EYES: Eyes grossly normal to inspection, PERRL and conjunctivae and sclerae normal  HENT: ear canals and TM's normal, nose and mouth without ulcers or lesions  NECK: no adenopathy, no asymmetry, masses, or scars and thyroid normal to palpation  RESP: lungs clear to auscultation - no rales, rhonchi or wheezes  BREAST: normal without masses, tenderness or nipple discharge and no palpable axillary masses or adenopathy  CV: regular rate and rhythm, normal S1 S2, no S3 or S4, no murmur, click or rub, no peripheral edema and peripheral pulses strong  ABDOMEN: soft, nontender, no hepatosplenomegaly, no masses and bowel sounds normal   (female): normal female external genitalia, normal urethral meatus , vaginal mucosa pink, moist, well rugated, normal cervix, adnexae, and uterus without masses. and pap obtained  MS: no gross musculoskeletal defects noted, no edema  SKIN: no suspicious lesions or rashes  NEURO: Normal strength and tone, mentation intact and speech normal  PSYCH: mentation appears normal, affect normal/bright    Diagnostic Test Results:  Labs reviewed in Epic    ASSESSMENT/PLAN:   1. Routine general medical examination at a health care facility     - Glucose    2. Major depression in complete remission (H)   stable on this dose, doing well, no side " "effects  - amitriptyline (ELAVIL) 25 MG tablet; Take 5 tablets (125 mg) by mouth At Bedtime To take with 100 mg for total 125 mg daily  Dispense: 450 tablet; Refill: 3    3. Cystic acne  Understands risk of long term antibiotic.   - amoxicillin (AMOXIL) 500 MG capsule; Take 1 capsule (500 mg) by mouth daily  Dispense: 90 capsule; Refill: 3    4. Hyperlipidemia LDL goal <130     - Lipid panel reflex to direct LDL Fasting  - simvastatin (ZOCOR) 40 MG tablet; Take 1 tablet (40 mg) by mouth At Bedtime  Dispense: 90 tablet; Refill: 3    5. Screening for cervical cancer     - HPV High Risk Types DNA Cervical  - Pap imaged thin layer screen with HPV - recommended age 30 - 65 years (select HPV order below)    COUNSELING:   Reviewed preventive health counseling, as reflected in patient instructions       Regular exercise       Healthy diet/nutrition    Estimated body mass index is 23.79 kg/m  as calculated from the following:    Height as of this encounter: 1.562 m (5' 1.5\").    Weight as of this encounter: 58.1 kg (128 lb).         reports that she has never smoked. She has never used smokeless tobacco.      Counseling Resources:  ATP IV Guidelines  Pooled Cohorts Equation Calculator  Breast Cancer Risk Calculator  FRAX Risk Assessment  ICSI Preventive Guidelines  Dietary Guidelines for Americans, 2010  USDA's MyPlate  ASA Prophylaxis  Lung CA Screening    Gris Uribe MD  [facility]  "

## 2020-08-04 LAB
COPATH REPORT: NORMAL
PAP: NORMAL

## 2020-08-07 LAB
FINAL DIAGNOSIS: NORMAL
HPV HR 12 DNA CVX QL NAA+PROBE: NEGATIVE
HPV16 DNA SPEC QL NAA+PROBE: NEGATIVE
HPV18 DNA SPEC QL NAA+PROBE: NEGATIVE
SPECIMEN DESCRIPTION: NORMAL
SPECIMEN SOURCE CVX/VAG CYTO: NORMAL

## 2021-01-04 ENCOUNTER — ANCILLARY PROCEDURE (OUTPATIENT)
Dept: MAMMOGRAPHY | Facility: CLINIC | Age: 66
End: 2021-01-04
Attending: FAMILY MEDICINE
Payer: COMMERCIAL

## 2021-01-04 DIAGNOSIS — Z12.31 VISIT FOR SCREENING MAMMOGRAM: ICD-10-CM

## 2021-01-04 PROCEDURE — 77063 BREAST TOMOSYNTHESIS BI: CPT | Mod: TC | Performed by: RADIOLOGY

## 2021-01-04 PROCEDURE — 77067 SCR MAMMO BI INCL CAD: CPT | Mod: TC | Performed by: RADIOLOGY

## 2021-03-03 ENCOUNTER — MYC MEDICAL ADVICE (OUTPATIENT)
Dept: FAMILY MEDICINE | Facility: CLINIC | Age: 66
End: 2021-03-03

## 2021-03-03 DIAGNOSIS — F32.5 MAJOR DEPRESSION IN COMPLETE REMISSION (H): Primary | ICD-10-CM

## 2021-03-03 DIAGNOSIS — E78.5 HYPERLIPIDEMIA LDL GOAL <130: ICD-10-CM

## 2021-03-04 RX ORDER — AMITRIPTYLINE HYDROCHLORIDE 100 MG/1
100 TABLET ORAL AT BEDTIME
Qty: 90 TABLET | Refills: 1 | Status: SHIPPED | OUTPATIENT
Start: 2021-03-04 | End: 2021-08-30

## 2021-03-04 RX ORDER — SIMVASTATIN 40 MG
40 TABLET ORAL AT BEDTIME
Qty: 90 TABLET | Refills: 1 | Status: SHIPPED | OUTPATIENT
Start: 2021-03-04 | End: 2021-09-17

## 2021-03-25 ENCOUNTER — IMMUNIZATION (OUTPATIENT)
Dept: NURSING | Facility: CLINIC | Age: 66
End: 2021-03-25
Payer: MEDICARE

## 2021-03-25 PROCEDURE — 0031A PR COVID VAC JANSSEN AD26 0.5ML: CPT

## 2021-03-25 PROCEDURE — 91303 PR COVID VAC JANSSEN AD26 0.5ML: CPT

## 2021-07-04 ENCOUNTER — E-VISIT (OUTPATIENT)
Dept: URGENT CARE | Facility: URGENT CARE | Age: 66
End: 2021-07-04
Payer: MEDICARE

## 2021-07-04 DIAGNOSIS — Z20.822 SUSPECTED COVID-19 VIRUS INFECTION: Primary | ICD-10-CM

## 2021-07-04 PROCEDURE — 99421 OL DIG E/M SVC 5-10 MIN: CPT | Performed by: PHYSICIAN ASSISTANT

## 2021-07-04 NOTE — PATIENT INSTRUCTIONS
Dear Gilda Petit,    Your symptoms show that you may have coronavirus (COVID-19). This illness can cause fever, cough and trouble breathing. Many people get a mild case and get better on their own. Some people can get very sick.    Will I be tested for COVID-19?  We would like to test you for Covid-19 virus. I have placed orders for this test.     To schedule: go to your [employer] home page and scroll down to the section that says  You have an appointment that needs to be scheduled  and click the large green button that says  Schedule Now  and follow the steps to find the next available openings.    If you are unable to complete these [employer] scheduling steps, please call [phone] to schedule your testing.     Return to work/school/ guidance:  Please let your workplace manager and staffing office know when your quarantine ends     We can t give you an exact date as it depends on the above. You can calculate this on your own or work with your manager/staffing office to calculate this. (For example if you were exposed on 10/4, you would have to quarantine for 14 full days. That would be through 10/18. You could return on 10/19.)      If you receive a positive COVID-19 test result, follow the guidance of the those who are giving you the results. Usually the return to work is 10 (or in some cases 20 days from symptom onset.) If you work at [facility], you must also be cleared by Employee Occupational Health and Safety to return to work.        If you receive a negative COVID-19 test result and did not have a high risk exposure to someone with a known positive COVID-19 test, you can return to work once you're free of fever for 24 hours without fever-reducing medication and your symptoms are improving or resolved.      If you receive a negative COVID-19 test and If you had a high risk exposure to someone who has tested positive for COVID-19 then you can return to work 14 days after your last contact  with the positive individual    Note: If you have ongoing exposure to the covid positive person, this quarantine period may be more than 14 days. (For example, if you are continued to be exposed to your child who tested positive and cannot isolate from them, then the quarantine of 7-14 days can't start until your child is no longer contagious. This is typically 10 days from onset of the child's symptoms. So the total duration may be 17-24 days in this case.)    Sign up for [employer].   We know it's scary to hear that you might have COVID-19. We want to track your symptoms to make sure you're okay over the next 2 weeks. Please look for an email from [employer]--this is a free, online program that we'll use to keep in touch. To sign up, follow the link in the email you will receive. Learn more at [email or web address]    How can I take care of myself?    Get lots of rest. Drink extra fluids (unless a doctor has told you not to)    Take Tylenol (acetaminophen) or ibuprofen for fever or pain. If you have liver or kidney problems, ask your family doctor if it's okay to take Tylenol o ibuprofen    If you have other health problems (like cancer, heart failure, an organ transplant or severe kidney disease): Call your specialty clinic if you don't feel better in the next 2 days.    Know when to call 911. Emergency warning signs include:  o Trouble breathing or shortness of breath  o Pain or pressure in the chest that doesn't go away  o Feeling confused like you haven't felt before, or not being able to wake up  o Bluish-colored lips or face    Where can I get more information?  [facility] - About COVID-19:   [email or web address]    CDC - What to Do If You're Sick:   www.cdc.gov/coronavirus/2019-ncov/about/steps-when-sick.html

## 2021-07-05 ENCOUNTER — AMBULATORY - HEALTHEAST (OUTPATIENT)
Dept: FAMILY MEDICINE | Facility: CLINIC | Age: 66
End: 2021-07-05

## 2021-07-05 DIAGNOSIS — Z20.822 SUSPECTED COVID-19 VIRUS INFECTION: ICD-10-CM

## 2021-07-06 ENCOUNTER — COMMUNICATION - HEALTHEAST (OUTPATIENT)
Dept: SCHEDULING | Facility: CLINIC | Age: 66
End: 2021-07-06

## 2021-08-22 ENCOUNTER — HEALTH MAINTENANCE LETTER (OUTPATIENT)
Age: 66
End: 2021-08-22

## 2021-09-17 ENCOUNTER — OFFICE VISIT (OUTPATIENT)
Dept: FAMILY MEDICINE | Facility: CLINIC | Age: 66
End: 2021-09-17
Payer: MEDICARE

## 2021-09-17 VITALS
OXYGEN SATURATION: 98 % | BODY MASS INDEX: 23.92 KG/M2 | DIASTOLIC BLOOD PRESSURE: 62 MMHG | HEIGHT: 62 IN | WEIGHT: 130 LBS | TEMPERATURE: 97.6 F | HEART RATE: 78 BPM | RESPIRATION RATE: 16 BRPM | SYSTOLIC BLOOD PRESSURE: 112 MMHG

## 2021-09-17 DIAGNOSIS — Z23 NEED FOR VACCINATION: ICD-10-CM

## 2021-09-17 DIAGNOSIS — E28.39 ESTROGEN DEFICIENCY: ICD-10-CM

## 2021-09-17 DIAGNOSIS — Z23 NEED FOR PROPHYLACTIC VACCINATION AND INOCULATION AGAINST INFLUENZA: ICD-10-CM

## 2021-09-17 DIAGNOSIS — E55.9 VITAMIN D DEFICIENCY: ICD-10-CM

## 2021-09-17 DIAGNOSIS — L70.0 CYSTIC ACNE: ICD-10-CM

## 2021-09-17 DIAGNOSIS — F32.5 MAJOR DEPRESSION IN COMPLETE REMISSION (H): ICD-10-CM

## 2021-09-17 DIAGNOSIS — D68.59 PRIMARY HYPERCOAGULABLE STATE (H): ICD-10-CM

## 2021-09-17 DIAGNOSIS — Z00.00 ENCOUNTER FOR MEDICARE ANNUAL WELLNESS EXAM: Primary | ICD-10-CM

## 2021-09-17 DIAGNOSIS — E78.5 HYPERLIPIDEMIA LDL GOAL <130: ICD-10-CM

## 2021-09-17 LAB
CHOLEST SERPL-MCNC: 230 MG/DL
DEPRECATED CALCIDIOL+CALCIFEROL SERPL-MC: 25 UG/L (ref 20–75)
FASTING STATUS PATIENT QL REPORTED: YES
HDLC SERPL-MCNC: 92 MG/DL
LDLC SERPL CALC-MCNC: 125 MG/DL
NONHDLC SERPL-MCNC: 138 MG/DL
TRIGL SERPL-MCNC: 65 MG/DL

## 2021-09-17 PROCEDURE — 36415 COLL VENOUS BLD VENIPUNCTURE: CPT | Performed by: FAMILY MEDICINE

## 2021-09-17 PROCEDURE — 82306 VITAMIN D 25 HYDROXY: CPT | Performed by: FAMILY MEDICINE

## 2021-09-17 PROCEDURE — 80061 LIPID PANEL: CPT | Performed by: FAMILY MEDICINE

## 2021-09-17 PROCEDURE — 90662 IIV NO PRSV INCREASED AG IM: CPT | Performed by: FAMILY MEDICINE

## 2021-09-17 PROCEDURE — 90732 PPSV23 VACC 2 YRS+ SUBQ/IM: CPT | Performed by: FAMILY MEDICINE

## 2021-09-17 PROCEDURE — G0008 ADMIN INFLUENZA VIRUS VAC: HCPCS | Performed by: FAMILY MEDICINE

## 2021-09-17 PROCEDURE — 99213 OFFICE O/P EST LOW 20 MIN: CPT | Mod: 25 | Performed by: FAMILY MEDICINE

## 2021-09-17 PROCEDURE — G0438 PPPS, INITIAL VISIT: HCPCS | Performed by: FAMILY MEDICINE

## 2021-09-17 PROCEDURE — G0009 ADMIN PNEUMOCOCCAL VACCINE: HCPCS | Performed by: FAMILY MEDICINE

## 2021-09-17 RX ORDER — AMITRIPTYLINE HYDROCHLORIDE 150 MG/1
150 TABLET ORAL AT BEDTIME
Qty: 90 TABLET | Refills: 3 | Status: SHIPPED | OUTPATIENT
Start: 2021-09-17 | End: 2022-08-31

## 2021-09-17 RX ORDER — AMOXICILLIN 500 MG/1
CAPSULE ORAL
Qty: 30 CAPSULE | Refills: 3 | Status: SHIPPED | OUTPATIENT
Start: 2021-09-17 | End: 2023-02-09

## 2021-09-17 RX ORDER — SIMVASTATIN 40 MG
40 TABLET ORAL AT BEDTIME
Qty: 90 TABLET | Refills: 3 | Status: SHIPPED | OUTPATIENT
Start: 2021-09-17 | End: 2022-12-02

## 2021-09-17 RX ORDER — AMITRIPTYLINE HYDROCHLORIDE 100 MG/1
100 TABLET ORAL AT BEDTIME
Qty: 90 TABLET | Refills: 3 | Status: CANCELLED | OUTPATIENT
Start: 2021-09-17

## 2021-09-17 ASSESSMENT — PAIN SCALES - GENERAL: PAINLEVEL: NO PAIN (0)

## 2021-09-17 ASSESSMENT — PATIENT HEALTH QUESTIONNAIRE - PHQ9: SUM OF ALL RESPONSES TO PHQ QUESTIONS 1-9: 0

## 2021-09-17 ASSESSMENT — ACTIVITIES OF DAILY LIVING (ADL): CURRENT_FUNCTION: NO ASSISTANCE NEEDED

## 2021-09-17 ASSESSMENT — MIFFLIN-ST. JEOR: SCORE: 1079.99

## 2021-09-17 NOTE — PATIENT INSTRUCTIONS
Vitamin D - [id number] international unit(s) daily of Vitamin D3    CALCIUM    Recommendations:  Teenagers and premenopausal women: 1200 mg/day  Pregnant and Lactating women: 1500 mg/day  Men and Postmenopausal women on estrogen: 1200mg/day  Postmenopausal women not on estrogen: 1500 mg/day    If you are not eating dairy products you also need 400 IU of vitamin D per day which can be obtained in either a multivitamin or in some of the Calcium tablets.    Dietary sources: These also contain vitamin D  Milk                            8 oz            300 mg  Yogurt                          1 cup           400 mg  Hard cheese                     1.5 oz          300 mg  Cottage cheese                  2 cup           300 mg  Orange juice with Calcium       8 oz            300 mg  Low fat dairy sources are recommended    Supplements:  Tums EX                         300 mg  Tums Ultra                      400 mg  Caltrate 600                    600 mg  Oscal                           500 mg  Oscal/D                         500 mg plus vitamin D  Women's Formula Multivitamin    450 mg      Patient Education     What is Metatarsalgia?  Metatarsalgia is pain in the ball of your foot. It is often caused by wearing shoes with thin soles and high heels. This puts extra pressure on the bones in the ball of the foot. Standing or walking on a hard surface for long periods also puts added pressure on the bones, causing pain. The pain can occur under any of the five metatarsal bones, although it's most common in the second. Bent or twisted toes and bunions can make the problem worse. So can being overweight. A tight Achilles tendon can worsen the problem. Sometimes high arches or arthritis can also cause metatarsalgia.    Inside the ball of your foot  The long bones in the middle of your foot are called the metatarsal bones. Each metatarsal bone ends in the ball of the foot. When you walk, these bones bear the weight of your body  as your foot pushes off the ground. If there is more pressure on the end of one bone, it presses on the skin beneath it. This causes pain and inflammation in the ball of the foot (metatarsalgia). A callus (a hard growth of skin) may also form on the ball of the foot.    Symptoms  The most common symptom of metatarsalgia is pain in the ball of the foot. It may feel as if you have a stone in your shoe. The ball of the foot may also become red and inflamed, and a callus may form under the end of the metatarsal bone.   [employer] last reviewed this educational content on 1/1/2018 2000-2021 [employer]. All rights reserved. This information is not intended as a substitute for professional medical care. Always follow your healthcare professional's instructions.           Patient Education     What Are Neuromas of the Foot?  The ball of your foot is the bottom part just behind your toes. Bands of tissue (ligaments) connect the bones in the ball of your foot. Nerves run between the bones and underneath the ligaments. When a nerve or a number of small nerves become pinched, this causes them to swell and become painful because of the thickening of the tissue that surrounds the nerves. The painful, swollen nerve or cluster of nerves is called a neuroma (also called Raines neuroma).      A neuroma most often happens at the base of either the third and fourth toes or the second and third toes.   What causes a neuroma?  Runners often report this problem and describe pain as they push off from the starting block. Wearing tight or high-heeled shoes can cause a neuroma. Shoes that are too narrow or too pointed squeeze the bones in the ball of the foot. Shoes with high heels put extra pressure on the ends of the bones. When the bones are squeezed together, they pinch the nerve that runs between them.   Symptoms  The most common symptom of a neuroma is pain in the ball of the foot between two toes. The pain may be dull  or sharp. It may feel as if you have a stone in your shoe. You may also have tingling or numbness in one or both of the toes. Symptoms may happen after you have been walking or standing for a while. Taking off your shoes and rubbing the ball of your foot may decrease or relieve the pain.   Preventing future problems  To prevent a future neuroma, buy shoes with plenty of room across the ball of the foot and in the toes. This keeps the bones from being squeezed together. Wearing low-heeled shoes (less than 2 inches) also puts less pressure on the bones and nerves in the ball of the foot.   [employer] last reviewed this educational content on 8/1/2020 2000-2021 [employer]. All rights reserved. This information is not intended as a substitute for professional medical care. Always follow your healthcare professional's instructions.

## 2021-09-17 NOTE — PROGRESS NOTES
"SUBJECTIVE:   Gilda Petit is a 66 year old female who presents for Preventive Visit.  Chief Complaint   Patient presents with     Physical     Imm/Inj     Flu Shot     Medication Question     would like to discuss amitriptyline dose      Medication Request     Patient would like amoxicillin for cystic acne when break out occur. Uses one every month      Numbness     Patient has numbness in bilateral feet X 5 years that has progressively getting worse.          Patient has been advised of split billing requirements and indicates understanding: Yes   Are you in the first 12 months of your Medicare coverage?  Yes,  Visual Acuity:  Right Eye: 20/25   Left Eye: 20/40  Both Eyes: 20/25    Healthy Habits:     In general, how would you rate your overall health?  Very good    Frequency of exercise:  2-3 days/week    Duration of exercise:  45-60 minutes    Do you usually eat at least 4 servings of fruit and vegetables a day, include whole grains    & fiber and avoid regularly eating high fat or \"junk\" foods?  Yes    Taking medications regularly:  Yes    Barriers to taking medications:  None    Medication side effects:  None    Ability to successfully perform activities of daily living:  No assistance needed    Home Safety:  No safety concerns identified    Hearing Impairment:  No hearing concerns    In the past 6 months, have you been bothered by leaking of urine?  No    In general, how would you rate your overall mental or emotional health?  Good      PHQ-2 Total Score: 0    Additional concerns today:  Yes  Imm/Inj      Do you feel safe in your environment? Yes    Have you ever done Advance Care Planning? (For example, a Health Directive, POLST, or a discussion with a medical provider or your loved ones about your wishes): Yes, patient states has an Advance Care Planning document and will bring a copy to the clinic.       Fall risk  Fallen 2 or more times in the past year?: No  Any fall with injury in the past year?: " No    Cognitive Screening   1) Repeat 3 items (Leader, Season, Table)    2) Clock draw: NORMAL  3) 3 item recall: Recalls 3 objects  Results: 3 items recalled: COGNITIVE IMPAIRMENT LESS LIKELY    Mini-CogTM Copyright [employer] Licensed by the author for use in [facility]; reprinted with permission ([email or web address]). All rights reserved.      Do you have sleep apnea, excessive snoring or daytime drowsiness?: no    Reviewed and updated as needed this visit by clinical staff  Tobacco  Allergies  Meds              Reviewed and updated as needed this visit by Provider                Social History     Tobacco Use     Smoking status: Never Smoker     Smokeless tobacco: Never Used   Substance Use Topics     Alcohol use: Yes     Comment: occ     If you drink alcohol do you typically have >3 drinks per day or >7 drinks per week? No    Alcohol Use 12/22/2016   Prescreen: >3 drinks/day or >7 drinks/week? The patient does not drink >3 drinks per day nor >7 drinks per week.   No flowsheet data found.        Hyperlipidemia Follow-Up      Are you regularly taking any medication or supplement to lower your cholesterol?   Yes- simvastatin    Are you having muscle aches or other side effects that you think could be caused by your cholesterol lowering medication?  No      Current providers sharing in care for this patient include:   Patient Care Team:  Gris Uribe MD as PCP - General (Family Practice)  Gris Uribe MD as Assigned PCP    The following health maintenance items are reviewed in Epic and correct as of today:  Health Maintenance Due   Topic Date Due     ANNUAL REVIEW OF HM ORDERS  Never done     FALL RISK ASSESSMENT  Never done     MEDICARE ANNUAL WELLNESS VISIT  07/31/2021     LIPID  07/31/2021     Lab work is in process  Pneumonia Vaccine:PPSV 23 given today  Any new diagnosis of family breast, ovarian, or bowel cancer? No    FHS-7: No flowsheet data found.      Pertinent mammograms are reviewed  "under the imaging tab.    Review of Systems  Constitutional, HEENT, cardiovascular, pulmonary, gi and gu systems are negative, except as otherwise noted.    Distal toe nubmness, seems to start in her metatarsal heads and affects distal toes    OBJECTIVE:   /62   Pulse 78   Temp 97.6  F (36.4  C) (Tympanic)   Resp 16   Ht 1.562 m (5' 1.5\")   Wt 59 kg (130 lb)   SpO2 98%   Breastfeeding No   BMI 24.17 kg/m   Estimated body mass index is 24.17 kg/m  as calculated from the following:    Height as of this encounter: 1.562 m (5' 1.5\").    Weight as of this encounter: 59 kg (130 lb).  Physical Exam  GENERAL: healthy, alert and no distress  NECK: no adenopathy, no asymmetry, masses, or scars and thyroid normal to palpation  RESP: lungs clear to auscultation - no rales, rhonchi or wheezes  CV: regular rate and rhythm, normal S1 S2, no S3 or S4, no murmur, click or rub, no peripheral edema and peripheral pulses strong  ABDOMEN: soft, nontender, no hepatosplenomegaly, no masses and bowel sounds normal  MS: no gross musculoskeletal defects noted, no edema.  Tender to palpation along metatarsal heads.  Peripheral pulses are full    Diagnostic Test Results:  Labs reviewed in Epic    ASSESSMENT / PLAN:   (Z00.00) Encounter for Medicare annual wellness exam  (primary encounter diagnosis)  Comment:    Plan:      (F32.5) Major depression in complete remission (H)  Comment: wants to further increase her amitriptyline (has been on for years - was on 125 mg previously but  Medicare wouldn't allow her to take 5 of the 25 mg pills). Feels she could use more than the 100 mg and would like to ramp up to 150.   Plan: amitriptyline (ELAVIL) 150 MG tablet         She will take 1.25 of the 100 mg for about a week and then go to 150 mg.    (E78.5) Hyperlipidemia LDL goal <130  Comment:    Plan: simvastatin (ZOCOR) 40 MG tablet, Lipid panel         reflex to direct LDL Fasting             (Z23) Need for prophylactic vaccination and " "inoculation against influenza  Comment:    Plan: INFLUENZA, QUAD, HIGH DOSE, PF, 65YR + (FLUZONE        HD)             (Z23) Need for vaccination  Comment:    Plan: Pneumococcal vaccine 23 valent PPSV23          (Pneumovax) [00025]             (L70.0) Cystic acne  Comment: takes very sporadically now when she gets a cystic acne lesion  Plan: amoxicillin (AMOXIL) 500 MG capsule             (E28.39) Estrogen deficiency  Comment:    Plan: DX Hip/Pelvis/Spine             (E55.9) Vitamin D deficiency  Comment:    Plan: Vitamin D Deficiency             (D68.59) Primary hypercoagulable state (H)  Comment:    Plan:  Factor V leiden heterozygote    Patient has been advised of split billing requirements and indicates understanding: Yes  COUNSELING:  Reviewed preventive health counseling, as reflected in patient instructions       Regular exercise       Healthy diet/nutrition    Estimated body mass index is 24.17 kg/m  as calculated from the following:    Height as of this encounter: 1.562 m (5' 1.5\").    Weight as of this encounter: 59 kg (130 lb).        She reports that she has never smoked. She has never used smokeless tobacco.      Appropriate preventive services were discussed with this patient, including applicable screening as appropriate for cardiovascular disease, diabetes, osteopenia/osteoporosis, and glaucoma.  As appropriate for age/gender, discussed screening for colorectal cancer, prostate cancer, breast cancer, and cervical cancer. Checklist reviewing preventive services available has been given to the patient.    Reviewed patients plan of care and provided an AVS. The Basic Care Plan (routine screening as documented in Health Maintenance) for Gilda meets the Care Plan requirement. This Care Plan has been established and reviewed with the Patient.    Counseling Resources:  ATP IV Guidelines  Pooled Cohorts Equation Calculator  Breast Cancer Risk Calculator  Breast Cancer: Medication to Reduce Risk  FRAX Risk " Assessment  ICSI Preventive Guidelines  Dietary Guidelines for Americans, 2010  USDA's MyPlate  ASA Prophylaxis  Lung CA Screening    Gris Uribe MD  [facility]    Identified Health Risks:

## 2021-09-24 DIAGNOSIS — F32.5 MAJOR DEPRESSION IN COMPLETE REMISSION (H): ICD-10-CM

## 2021-09-24 RX ORDER — AMITRIPTYLINE HYDROCHLORIDE 100 MG/1
TABLET ORAL
Qty: 30 TABLET | Refills: 0 | OUTPATIENT
Start: 2021-09-24

## 2021-10-07 ENCOUNTER — OFFICE VISIT (OUTPATIENT)
Dept: FAMILY MEDICINE | Facility: CLINIC | Age: 66
End: 2021-10-07
Payer: MEDICARE

## 2021-10-07 ENCOUNTER — ANCILLARY PROCEDURE (OUTPATIENT)
Dept: GENERAL RADIOLOGY | Facility: CLINIC | Age: 66
End: 2021-10-07
Attending: NURSE PRACTITIONER
Payer: MEDICARE

## 2021-10-07 VITALS
RESPIRATION RATE: 14 BRPM | BODY MASS INDEX: 25.4 KG/M2 | HEART RATE: 83 BPM | WEIGHT: 138 LBS | HEIGHT: 62 IN | DIASTOLIC BLOOD PRESSURE: 64 MMHG | SYSTOLIC BLOOD PRESSURE: 112 MMHG | OXYGEN SATURATION: 97 % | TEMPERATURE: 99.4 F

## 2021-10-07 DIAGNOSIS — S99.911A ANKLE INJURY, RIGHT, INITIAL ENCOUNTER: ICD-10-CM

## 2021-10-07 DIAGNOSIS — S99.911A ANKLE INJURY, RIGHT, INITIAL ENCOUNTER: Primary | ICD-10-CM

## 2021-10-07 DIAGNOSIS — S93.401A MODERATE RIGHT ANKLE SPRAIN, INITIAL ENCOUNTER: ICD-10-CM

## 2021-10-07 PROCEDURE — 73610 X-RAY EXAM OF ANKLE: CPT | Mod: RT | Performed by: RADIOLOGY

## 2021-10-07 PROCEDURE — 99213 OFFICE O/P EST LOW 20 MIN: CPT | Performed by: NURSE PRACTITIONER

## 2021-10-07 ASSESSMENT — MIFFLIN-ST. JEOR: SCORE: 1116.27

## 2021-10-07 ASSESSMENT — PAIN SCALES - GENERAL: PAINLEVEL: EXTREME PAIN (8)

## 2021-10-07 NOTE — PROGRESS NOTES
Assessment & Plan       ICD-10-CM    1. Ankle injury, right, initial encounter  S99.911A XR Ankle Right G/E 3 Views     Ankle/Foot Bracing Supplies Order for DME - ONLY FOR DME   2. Moderate right ankle sprain, initial encounter  S93.401A Ankle/Foot Bracing Supplies Order for DME - ONLY FOR DME     Acute ankle injury consistent with a sprain.  Imaging done today does not show any acute fracture.  DME supplied to patient of Tri-Lock ankle brace.  Symptomatic management discussed and provided in AVS.             Patient Instructions   Patient Education     Treating Ankle Sprains  Treatment will depend on how bad your sprain is. For a severe sprain, healing may take 3 months or more.  Right after your injury: Use R.I.C.E.    BIG: Rest: At first, keep weight off the ankle as much as you can. You may be given crutches to help you walk without putting weight on the ankle.    BIG: Ice: Put an ice pack on the ankle for 20 minutes. Remove the pack and wait at least 30 minutes. Repeat for up to 3 days. This helps reduce swelling.    BIG: Compression: To reduce swelling and keep the joint stable, you may need to wrap the ankle with an elastic bandage. For more severe sprains, you may need an ankle brace, a boot, or a cast.    BIG: Elevation: To reduce swelling, keep your ankle raised above your heart when you sit or lie down.  Medicine  Your healthcare provider may suggest oral nonsteroidal anti-inflammatory medicine (NSAIDs), such as ibuprofen. This relieves the pain and helps reduce swelling. Be sure to take your medicine as directed.  Exercises    After about 2 to 3 weeks, you may be given exercises to strengthen the ligaments and muscles in the ankle. Doing these exercises will help prevent another ankle sprain. Exercises may include standing on your toes and then on your heels and doing ankle curls.    Sit on the edge of a sturdy table or lie on your back.    Pull your toes toward you. Then point them away from you.  "Repeat for 2 to 3 minutes.  [email or web address] last reviewed this educational content on 1/1/2018 2000-2021 [employer]. All rights reserved. This information is not intended as a substitute for professional medical care. Always follow your healthcare professional's instructions.               Return in about 2 weeks (around 10/21/2021) for ongoing symptoms if not improving.    SUMMER Mckeon CNP  [facility]    Pee Vallecillo is a 66 year old who presents for the following health issues     HPI     Musculoskeletal problem/pain  Onset/Duration: 2 days  Description  Location: ankle - right  Joint Swelling: YES  Redness: YES  Pain: YES  Warmth: YES  Intensity:  moderate, severe  Progression of Symptoms:  worsening and constant  Accompanying signs and symptoms:   Fevers: no  Numbness/tingling/weakness: no  History  Trauma to the area: YES- missed last step on stairs  Recent illness:  no  Previous similar problem: no  Previous evaluation:  no  Precipitating or alleviating factors:  Aggravating factors include: standing, walking, climbing stairs, lifting, exercise and overuse  Therapies tried and outcome: rest/inactivity, ice and Ibuprofen  Helped some        Review of Systems   Constitutional, HEENT, cardiovascular, pulmonary, gi and gu systems are negative, except as otherwise noted.      Objective    /64 (BP Location: Right arm, Patient Position: Chair, Cuff Size: Adult Large)   Pulse 83   Temp 99.4  F (37.4  C) (Tympanic)   Resp 14   Ht 1.562 m (5' 1.5\")   Wt 62.6 kg (138 lb)   LMP  (LMP Unknown)   SpO2 97%   Breastfeeding No   BMI 25.65 kg/m    Body mass index is 25.65 kg/m .  Physical Exam   GENERAL: healthy, alert and no distress  MS: RLE exam shows acute swelling of right foot with bruising around ankle and lateral aspect of foot. Pain palpation of heel. Varus rotation of foot elicits pain. No pain over metatarsals. No significant deformity identified. "

## 2021-10-07 NOTE — PATIENT INSTRUCTIONS
Patient Education     Treating Ankle Sprains  Treatment will depend on how bad your sprain is. For a severe sprain, healing may take 3 months or more.  Right after your injury: Use R.I.C.E.    BIG: Rest: At first, keep weight off the ankle as much as you can. You may be given crutches to help you walk without putting weight on the ankle.    BIG: Ice: Put an ice pack on the ankle for 20 minutes. Remove the pack and wait at least 30 minutes. Repeat for up to 3 days. This helps reduce swelling.    BIG: Compression: To reduce swelling and keep the joint stable, you may need to wrap the ankle with an elastic bandage. For more severe sprains, you may need an ankle brace, a boot, or a cast.    BIG: Elevation: To reduce swelling, keep your ankle raised above your heart when you sit or lie down.  Medicine  Your healthcare provider may suggest oral nonsteroidal anti-inflammatory medicine (NSAIDs), such as ibuprofen. This relieves the pain and helps reduce swelling. Be sure to take your medicine as directed.  Exercises    After about 2 to 3 weeks, you may be given exercises to strengthen the ligaments and muscles in the ankle. Doing these exercises will help prevent another ankle sprain. Exercises may include standing on your toes and then on your heels and doing ankle curls.    Sit on the edge of a sturdy table or lie on your back.    Pull your toes toward you. Then point them away from you. Repeat for 2 to 3 minutes.  [employer] last reviewed this educational content on 1/1/2018 2000-2021 [employer]. All rights reserved. This information is not intended as a substitute for professional medical care. Always follow your healthcare professional's instructions.

## 2022-04-04 ENCOUNTER — ANCILLARY PROCEDURE (OUTPATIENT)
Dept: MAMMOGRAPHY | Facility: CLINIC | Age: 67
End: 2022-04-04
Attending: FAMILY MEDICINE
Payer: MEDICARE

## 2022-04-04 DIAGNOSIS — Z12.31 VISIT FOR SCREENING MAMMOGRAM: ICD-10-CM

## 2022-04-04 PROCEDURE — 77067 SCR MAMMO BI INCL CAD: CPT | Mod: TC | Performed by: RADIOLOGY

## 2022-04-04 PROCEDURE — 77063 BREAST TOMOSYNTHESIS BI: CPT | Mod: TC | Performed by: RADIOLOGY

## 2022-08-31 DIAGNOSIS — F32.5 MAJOR DEPRESSION IN COMPLETE REMISSION (H): ICD-10-CM

## 2022-08-31 RX ORDER — AMITRIPTYLINE HYDROCHLORIDE 150 MG/1
TABLET ORAL
Qty: 90 TABLET | Refills: 0 | Status: SHIPPED | OUTPATIENT
Start: 2022-08-31 | End: 2022-12-02

## 2022-11-19 ENCOUNTER — HEALTH MAINTENANCE LETTER (OUTPATIENT)
Age: 67
End: 2022-11-19

## 2023-02-09 ENCOUNTER — ANCILLARY PROCEDURE (OUTPATIENT)
Dept: GENERAL RADIOLOGY | Facility: CLINIC | Age: 68
End: 2023-02-09
Attending: FAMILY MEDICINE
Payer: MEDICARE

## 2023-02-09 ENCOUNTER — OFFICE VISIT (OUTPATIENT)
Dept: FAMILY MEDICINE | Facility: CLINIC | Age: 68
End: 2023-02-09
Payer: MEDICARE

## 2023-02-09 VITALS
RESPIRATION RATE: 14 BRPM | DIASTOLIC BLOOD PRESSURE: 70 MMHG | OXYGEN SATURATION: 100 % | HEIGHT: 62 IN | SYSTOLIC BLOOD PRESSURE: 118 MMHG | TEMPERATURE: 97.5 F | WEIGHT: 132.25 LBS | BODY MASS INDEX: 24.34 KG/M2 | HEART RATE: 75 BPM

## 2023-02-09 DIAGNOSIS — R73.03 PREDIABETES: ICD-10-CM

## 2023-02-09 DIAGNOSIS — D68.59 PRIMARY HYPERCOAGULABLE STATE (H): ICD-10-CM

## 2023-02-09 DIAGNOSIS — Z00.00 ENCOUNTER FOR MEDICARE ANNUAL WELLNESS EXAM: Primary | ICD-10-CM

## 2023-02-09 DIAGNOSIS — Z78.0 POSTMENOPAUSAL STATUS: ICD-10-CM

## 2023-02-09 DIAGNOSIS — M25.551 HIP PAIN, RIGHT: ICD-10-CM

## 2023-02-09 DIAGNOSIS — F32.5 MAJOR DEPRESSIVE DISORDER WITH SINGLE EPISODE, IN FULL REMISSION (H): ICD-10-CM

## 2023-02-09 DIAGNOSIS — E78.5 HYPERLIPIDEMIA LDL GOAL <130: ICD-10-CM

## 2023-02-09 LAB
CHOLEST SERPL-MCNC: 214 MG/DL
HBA1C MFR BLD: 5.7 % (ref 0–5.6)
HDLC SERPL-MCNC: 88 MG/DL
LDLC SERPL CALC-MCNC: 111 MG/DL
NONHDLC SERPL-MCNC: 126 MG/DL
TRIGL SERPL-MCNC: 76 MG/DL

## 2023-02-09 PROCEDURE — 99214 OFFICE O/P EST MOD 30 MIN: CPT | Mod: 25 | Performed by: FAMILY MEDICINE

## 2023-02-09 PROCEDURE — 80061 LIPID PANEL: CPT | Performed by: FAMILY MEDICINE

## 2023-02-09 PROCEDURE — 73502 X-RAY EXAM HIP UNI 2-3 VIEWS: CPT | Mod: TC | Performed by: RADIOLOGY

## 2023-02-09 PROCEDURE — 36415 COLL VENOUS BLD VENIPUNCTURE: CPT | Performed by: FAMILY MEDICINE

## 2023-02-09 PROCEDURE — G0439 PPPS, SUBSEQ VISIT: HCPCS | Performed by: FAMILY MEDICINE

## 2023-02-09 PROCEDURE — 83036 HEMOGLOBIN GLYCOSYLATED A1C: CPT | Performed by: FAMILY MEDICINE

## 2023-02-09 RX ORDER — AMITRIPTYLINE HYDROCHLORIDE 150 MG/1
150 TABLET ORAL AT BEDTIME
Qty: 90 TABLET | Refills: 3 | Status: SHIPPED | OUTPATIENT
Start: 2023-02-09 | End: 2023-02-09 | Stop reason: DRUGHIGH

## 2023-02-09 RX ORDER — SIMVASTATIN 40 MG
40 TABLET ORAL AT BEDTIME
Qty: 90 TABLET | Refills: 3 | Status: SHIPPED | OUTPATIENT
Start: 2023-02-09 | End: 2024-02-15

## 2023-02-09 ASSESSMENT — ENCOUNTER SYMPTOMS
ARTHRALGIAS: 1
MYALGIAS: 0
PALPITATIONS: 0
CHILLS: 0
NERVOUS/ANXIOUS: 0
HEMATURIA: 0
DIZZINESS: 0
BREAST MASS: 0
SHORTNESS OF BREATH: 0
EYE PAIN: 0
NAUSEA: 0
PARESTHESIAS: 0
ABDOMINAL PAIN: 0
FREQUENCY: 0
HEARTBURN: 0
DIARRHEA: 0
WEAKNESS: 0
DYSURIA: 0
COUGH: 0
HEMATOCHEZIA: 0
FEVER: 0
SORE THROAT: 0
HEADACHES: 0
CONSTIPATION: 0
JOINT SWELLING: 0

## 2023-02-09 ASSESSMENT — PATIENT HEALTH QUESTIONNAIRE - PHQ9
SUM OF ALL RESPONSES TO PHQ QUESTIONS 1-9: 0
SUM OF ALL RESPONSES TO PHQ QUESTIONS 1-9: 0

## 2023-02-09 ASSESSMENT — PAIN SCALES - GENERAL: PAINLEVEL: SEVERE PAIN (6)

## 2023-02-09 ASSESSMENT — ACTIVITIES OF DAILY LIVING (ADL): CURRENT_FUNCTION: NO ASSISTANCE NEEDED

## 2023-02-09 NOTE — PATIENT INSTRUCTIONS
Sign up for an account on [facility]    Send me a message or call my care team regarding having a prescription sent to them after the account is created.      This can result in significant cost savings for you!      Other options to get 125 mg of amitriptyline is to do a 100 mg pill and a 25 mg pill (if there are quantity limits).        Patient Education   Personalized Prevention Plan  You are due for the preventive services outlined below.  Your care team is available to assist you in scheduling these services.  If you have already completed any of these items, please share that information with your care team to update in your medical record.  Health Maintenance Due   Topic Date Due    COVID-19 Vaccine (3 - Booster for Mary series) 12/29/2021    Flu Vaccine (1) 09/01/2022    Cholesterol Lab  09/17/2022    ANNUAL REVIEW OF HM ORDERS  09/17/2022    FALL RISK ASSESSMENT  09/17/2022    Pneumococcal Vaccine (2 - PCV) 09/17/2022

## 2023-02-09 NOTE — RESULT ENCOUNTER NOTE
Gilda,    These labs are normal or acceptable.    Please contact my office if you have questions.    Gris Uribe M.D.

## 2023-02-09 NOTE — PROGRESS NOTES
SUBJECTIVE:   Ruth Ann is a 67 year old who presents for Preventive Visit.  Patient has been advised of split billing requirements and indicates understanding: Yes  Are you in the first 12 months of your Medicare coverage?  No    HPI    Have you ever done Advance Care Planning? (For example, a Health Directive, POLST, or a discussion with a medical provider or your loved ones about your wishes): Yes, advance care planning is on file.       Fall risk       Cognitive Screening   1) Repeat 3 items (Leader, Season, Table)    2) Clock draw: NORMAL  3) 3 item recall: Recalls 3 objects  Results: 3 items recalled: COGNITIVE IMPAIRMENT LESS LIKELY    Mini-CogTM Copyright S [employer]j. Licensed by the author for use in [facility]; reprinted with permission (solakesha@[facility]). All rights reserved.      Do you have sleep apnea, excessive snoring or daytime drowsiness?: no    Reviewed and updated as needed this visit by clinical staff   Tobacco  Allergies  Meds              Reviewed and updated as needed this visit by Provider                 Social History     Tobacco Use     Smoking status: Never     Smokeless tobacco: Never   Substance Use Topics     Alcohol use: Yes     Comment: occ     If you drink alcohol do you typically have >3 drinks per day or >7 drinks per week? No      - She will be getting vaccine update at pharmacy    Hyperlipidemia Follow-Up  Simvastatin 40mg qd    Are you regularly taking any medication or supplement to lower your cholesterol?   Yes- statin    Are you having muscle aches or other side effects that you think could be caused by your cholesterol lowering medication?  No    Depression Followup  Amitriptyline 150mg at bedtime - She switched insurance and needs to switch medication dosage    How are you doing with your depression since your last visit? No change    Are you having other symptoms that might be associated with depression? No    Have you had a significant life event?  No     Are you  feeling anxious or having panic attacks?   No    Do you have any concerns with your use of alcohol or other drugs? No    Social History     Tobacco Use     Smoking status: Never     Smokeless tobacco: Never   Vaping Use     Vaping Use: Never used   Substance Use Topics     Alcohol use: Yes     Comment: occ     Drug use: No     PHQ 7/11/2019 7/31/2020 9/17/2021   PHQ-9 Total Score 0 0 0   Q9: Thoughts of better off dead/self-harm past 2 weeks Not at all Not at all Not at all     MENDEZ-7 SCORE 3/21/2013 10/26/2016 7/11/2019   Total Score 0 - -   Total Score - 0 (minimal anxiety) -   Total Score - 0 0         Suicide Assessment Five-step Evaluation and Treatment (SAFE-T)          Current providers sharing in care for this patient include:   Patient Care Team:  Gris Uribe MD as PCP - General (Family Practice)  Gris Uribe MD as Assigned PCP    The following health maintenance items are reviewed in Epic and correct as of today:  Health Maintenance   Topic Date Due     COVID-19 Vaccine (3 - Booster for Mary series) 12/29/2021     INFLUENZA VACCINE (1) 09/01/2022     LIPID  09/17/2022     ANNUAL REVIEW OF HM ORDERS  09/17/2022     FALL RISK ASSESSMENT  09/17/2022     Pneumococcal Vaccine: 65+ Years (2 - PCV) 09/17/2022     PHQ-9  08/09/2023     MEDICARE ANNUAL WELLNESS VISIT  02/09/2024     MAMMO SCREENING  04/04/2024     DTAP/TDAP/TD IMMUNIZATION (4 - Td or Tdap) 08/14/2024     ADVANCE CARE PLANNING  09/17/2026     COLORECTAL CANCER SCREENING  12/09/2026     DEXA  09/26/2028     HEPATITIS C SCREENING  Completed     DEPRESSION ACTION PLAN  Completed     ZOSTER IMMUNIZATION  Completed     IPV IMMUNIZATION  Aged Out     MENINGITIS IMMUNIZATION  Aged Out     PAP  Discontinued     Lab work is in process  Labs reviewed in EPIC    Any new diagnosis of family breast, ovarian, or bowel cancer? No    FHS-7:   Breast CA Risk Assessment (FHS-7) 4/4/2022   Did any of your first-degree relatives have breast or ovarian  "cancer? No   Did any of your relatives have bilateral breast cancer? No   Did any man in your family have breast cancer? No   Did any woman in your family have breast and ovarian cancer? No   Did any woman in your family have breast cancer before age 50 y? No   Do you have 2 or more relatives with breast and/or ovarian cancer? No   Do you have 2 or more relatives with breast and/or bowel cancer? No       Mammogram Screening: Recommended annual mammography  Pertinent mammograms are reviewed under the imaging tab.    Review of Systems  Constitutional, HEENT, cardiovascular, pulmonary, gi and gu systems are negative, except as otherwise noted.    Hip pain - progression of lateral hip pain with certain activities.  Does sometimes wake at night with pain.     OBJECTIVE:   /70   Pulse 75   Temp 97.5  F (36.4  C) (Tympanic)   Resp 14   Ht 1.562 m (5' 1.5\")   Wt 60 kg (132 lb 4 oz)   LMP  (LMP Unknown)   SpO2 100%   BMI 24.58 kg/m   Estimated body mass index is 24.58 kg/m  as calculated from the following:    Height as of this encounter: 1.562 m (5' 1.5\").    Weight as of this encounter: 60 kg (132 lb 4 oz).  Physical Exam  GENERAL APPEARANCE: healthy, alert and no distress  EYES: Eyes grossly normal to inspection, PERRL and conjunctivae and sclerae normal  HENT: ear canals and TM's normal, nose and mouth without ulcers or lesions, oropharynx clear and oral mucous membranes moist  NECK: no adenopathy, no asymmetry, masses, or scars and thyroid normal to palpation  RESP: lungs clear to auscultation - no rales, rhonchi or wheezes  CV: regular rate and rhythm, normal S1 S2, no S3 or S4, no murmur, click or rub, no peripheral edema and peripheral pulses strong  ABDOMEN: soft, nontender, no hepatosplenomegaly, no masses and bowel sounds normal  MS: mildly tender to palpation over right greater trochanter.  There is pain with external rotation and range of motion is slightly limited in external rotation  SKIN: no " suspicious lesions or rashes  NEURO: Normal strength and tone, sensory exam grossly normal, mentation intact and speech normal  PSYCH: mentation appears normal and affect normal/bright    Diagnostic Test Results:  Labs reviewed in Epic  Xray - negative    Results for orders placed or performed in visit on 02/09/23   XR Pelvis and Hip Right 1 View     Status: None    Narrative    XR PELVIS AND HIP RIGHT 1 VIEW 2/9/2023 10:54 AM    HISTORY: Hip pain, right    COMPARISON: None.      Impression    IMPRESSION: No fracture. No degenerative changes. Small calcific or  ossific density along the lateral margin of the right greater  trochanter.    FEDERICA BRYANT MD         SYSTEM ID:  [id number]   Results for orders placed or performed in visit on 02/09/23   Hemoglobin A1c     Status: Abnormal   Result Value Ref Range    Hemoglobin A1C 5.7 (H) 0.0 - 5.6 %         ASSESSMENT / PLAN:   (Z00.00) Encounter for Medicare annual wellness exam  (primary encounter diagnosis)  Comment:    Plan: Hemoglobin A1c             (E78.5) Hyperlipidemia LDL goal <130  Comment:    Plan: Lipid panel reflex to direct LDL Fasting,         simvastatin (ZOCOR) 40 MG tablet             (F32.5) Major depressive disorder with single episode, in full remission (H)  Comment: stable, finds that 150 mg of amitripyline is too much and makes her groggy.  Wants to be back to 125 mg daily and will let me know if this is not covered by insurance. May need to split into a 100 mg and a 25 mg and do two prescriptions.    Plan: amitriptyline (ELAVIL) 25 MG tablet,         OFFICE/OUTPT VISIT,EST,LEVL IV, DISCONTINUED:         amitriptyline (ELAVIL) 150 MG tablet             (D68.59) Primary hypercoagulable state (H)  Comment: factor V leiden heterozygous  Plan:      (Z78.0) Postmenopausal status  Comment:    Plan: DX Hip/Pelvis/Spine         Due for updated bone density    (M25.551) Hip pain, right  Comment: could be soft tissue/labral  Will have her see PT to  start. Consider ortho consultation if not improving.  Plan: XR Pelvis and Hip Right 1 View, OFFICE/OUTPT         VISIT,EST,LEVL IV, Physical Therapy Referral             (R73.03) Prediabetes  Comment:    Plan: work on lowering sugar and carbs in diet.    Patient has been advised of split billing requirements and indicates understanding: Yes      COUNSELING:  Reviewed preventive health counseling, as reflected in patient instructions       Regular exercise       Healthy diet/nutrition        She reports that she has never smoked. She has never used smokeless tobacco.      Appropriate preventive services were discussed with this patient, including applicable screening as appropriate for cardiovascular disease, diabetes, osteopenia/osteoporosis, and glaucoma.  As appropriate for age/gender, discussed screening for colorectal cancer, prostate cancer, breast cancer, and cervical cancer. Checklist reviewing preventive services available has been given to the patient.    Reviewed patients plan of care and provided an AVS. The Basic Care Plan (routine screening as documented in Health Maintenance) for Gilda meets the Care Plan requirement. This Care Plan has been established and reviewed with the Patient.      Gris Uribe MD  [facility]    Identified Health Risks:      Answers for HPI/ROS submitted by the patient on 2/9/2023  PHQ9 TOTAL SCORE: 0

## 2023-02-24 ENCOUNTER — HOSPITAL ENCOUNTER (OUTPATIENT)
Dept: PHYSICAL THERAPY | Facility: CLINIC | Age: 68
Setting detail: THERAPIES SERIES
Discharge: HOME | End: 2023-02-24
Attending: FAMILY MEDICINE
Payer: MEDICARE

## 2023-02-24 DIAGNOSIS — M25.551 HIP PAIN, RIGHT: ICD-10-CM

## 2023-02-24 PROCEDURE — 97161 PT EVAL LOW COMPLEX 20 MIN: CPT | Mod: GP | Performed by: PHYSICAL THERAPIST

## 2023-02-24 PROCEDURE — 97110 THERAPEUTIC EXERCISES: CPT | Mod: GP | Performed by: PHYSICAL THERAPIST

## 2023-02-24 NOTE — PROGRESS NOTES
[facility]    OUTPATIENT PHYSICAL THERAPY ORTHOPEDIC EVALUATION  PLAN OF TREATMENT FOR OUTPATIENT REHABILITATION  (COMPLETE FOR INITIAL CLAIMS ONLY)  Patient's Last Name, First Name, M.I.  YOB: 1955  Gilda Petit    Provider s Name:  [facility]   Medical Record No.  [id number]   Start of Care Date:  02/24/23   Onset Date:  02/09/23   Type:     _X__PT   ___OT   ___SLP Medical Diagnosis:  R hip pain     PT Diagnosis:  R hip pain   Visits from SOC:  1      _________________________________________________________________________________  Plan of Treatment/Functional Goals:  balance training, manual therapy, neuromuscular re-education, strengthening, stretching           Goals     Goal Description: Up/down stairs no pain no difficulty, no rail reciprocal stepping  Target Date: 04/21/23       Goal Description: no pain in morning when awakening  Target Date: 04/07/23       Goal Description: Independent in HEP incorporated into regular fitness routine  Target Date: 05/05/23           Therapy Frequency:  1 time/week (1x every 2-3 weeks after first week)  Predicted Duration of Therapy Intervention:  10 weeks    Carine Daley, PT DPT                 I CERTIFY THE NEED FOR THESE SERVICES FURNISHED UNDER        THIS PLAN OF TREATMENT AND WHILE UNDER MY CARE     (Physician co-signature of this document indicates review and certification of the therapy plan).                     Certification Date From:  02/24/23   Certification Date To:  05/05/23    Referring Provider:  Gris Uribe MD    Initial Assessment        See Epic Evaluation Start of Care Date: 02/24/23

## 2023-02-24 NOTE — PROGRESS NOTES
02/24/23 1000   General Information   Type of Visit Initial OP Ortho PT Evaluation   Start of Care Date 02/24/23   Referring Physician Gris Uribe MD   Orders Evaluate and Treat   Date of Order 02/09/23   Certification Required? Yes   Medical Diagnosis r hip pain   Surgical/Medical history reviewed Yes   Precautions/Limitations no known precautions/limitations   Body Part(s)   Body Part(s) Hip   Presentation and Etiology   Pertinent history of current problem (include personal factors and/or comorbidities that impact the POC) 68 yo female with c/o R hip pain since fall 2022; pain in morning when awakens; persists for a few hours; pain increased up stairs; PMH: depression and hyperlipidemia on statin since 21yrs old   Impairments A. Pain;B. Decreased WB tolerance;E. Decreased flexibility   Functional Limitations perform activities of daily living;perform desired leisure / sports activities   Symptom Location R hip   How/Where did it occur From insidious onset   Onset date of current episode/exacerbation 02/09/23   Chronicity Chronic   Pain rating (0-10 point scale) Best (/10);Worst (/10)   Best (/10) 1   Worst (/10) 4   Pain quality B. Dull;C. Aching   Frequency of pain/symptoms C. With activity   Pain/symptoms exacerbated by B. Walking;G. Certain positions;M. Other  (climbing stairsw)   Pain exacerbation comment climbing stairs   Pain/symptoms eased by A. Sitting;C. Rest;F. Certain positions   Current / Previous Interventions   Diagnostic Tests: X-ray   X-ray Results unremarkable   Current Level of Function   Current Community Support Family/friend caregiver   Patient role/employment history F. Retired   Living environment House/townhome   Home/community accessibility split level   Fall Risk Screen   Fall screen completed by PT   Abuse Screen (yes response referral indicated)   Feels Unsafe at Home or Work/School no   Feels Threatened by Someone no   Does Anyone Try to Keep You From Having Contact with Others or  Doing Things Outside Your Home? no   Physical Signs of Abuse Present no   Hip Objective Findings   Gait/Locomotion wnl   Balance/Proprioception (Single Leg Stance) less than 5 sec either wobbles at hips and thighs   Side (if bilateral, select both right and left) Right   Hip ROM Comments full   Functional Closed Chain Screen hip add   Hip/Knee Strength Comments weak   Straight Leg Raise Test -   Scour Test -   HUMPHREY Test -   FADIR Test -   Hip Special Tests Comments -   Observation NAD   Posture wnl   Eliud Flexibility Test slightly tight   Obers/ITB Flexibility R slightly tight   Right Hamstring Flexibility slightly tight   Right Prone Quad Flexibility slightly tight   Right Hip Flexion Strength 4   Right Hip Abduction Strength 4-   Right Hip Extension Strength 4-   Right Knee Extension Strength 4   Planned Therapy Interventions   Planned Therapy Interventions balance training;manual therapy;neuromuscular re-education;strengthening;stretching   Clinical Impression   Criteria for Skilled Therapeutic Interventions Met yes, treatment indicated   PT Diagnosis R hip pain   Influenced by the following impairments pain; weakness; decreased balance   Functional limitations due to impairments pain with ADLs; stairs   Clinical Presentation Stable/Uncomplicated   Clinical Decision Making (Complexity) Low complexity   Therapy Frequency 1 time/week  (1x every 2-3 weeks after first week)   Predicted Duration of Therapy Intervention (days/wks) 10 weeks   Risk & Benefits of therapy have been explained Yes   Patient, Family & other staff in agreement with plan of care Yes   Clinical Impression Comments Pt will benefit from skilled intervention to decrease pain, improve strength and functional mobility allowing her to return to pain free lifestyle   Education Assessment   Preferred Learning Style Listening;Reading;Demonstration;Pictures/video   Barriers to Learning No barriers   ORTHO GOALS   PT Ortho Eval Goals 1;2;3   Ortho Goal  1   Goal Description Up/down stairs no pain no difficulty, no rail reciprocal stepping   Target Date 04/21/23   Ortho Goal 2   Goal Description no pain in morning when awakening   Target Date 04/07/23   Ortho Goal 3   Goal Description Independent in HEP incorporated into regular fitness routine   Target Date 05/05/23   Total Evaluation Time   PT Eval, Low Complexity Minutes (49105) 15   Therapy Certification   Certification date from 02/24/23   Certification date to 05/05/23   Medical Diagnosis R hip pain

## 2023-03-03 ENCOUNTER — HOSPITAL ENCOUNTER (OUTPATIENT)
Dept: PHYSICAL THERAPY | Facility: CLINIC | Age: 68
Setting detail: THERAPIES SERIES
Discharge: HOME | End: 2023-03-03
Attending: FAMILY MEDICINE
Payer: MEDICARE

## 2023-03-03 PROCEDURE — 97140 MANUAL THERAPY 1/> REGIONS: CPT | Mod: GP | Performed by: PHYSICAL THERAPIST

## 2023-03-03 PROCEDURE — 97110 THERAPEUTIC EXERCISES: CPT | Mod: GP | Performed by: PHYSICAL THERAPIST

## 2023-03-17 ENCOUNTER — HOSPITAL ENCOUNTER (OUTPATIENT)
Dept: PHYSICAL THERAPY | Facility: CLINIC | Age: 68
Setting detail: THERAPIES SERIES
Discharge: HOME | End: 2023-03-17
Attending: FAMILY MEDICINE
Payer: MEDICARE

## 2023-03-17 PROCEDURE — 97110 THERAPEUTIC EXERCISES: CPT | Mod: GP | Performed by: PHYSICAL THERAPIST

## 2023-03-17 NOTE — PROGRESS NOTES
Gilda Petit  1955  Gris Uribe MD  [id number] [address]  Diagnosis; R hip pain  Physical Therapy Discharge Summary  03/17/23 0900   Signing Clinician's Name / Credentials   Signing clinician's name / credentials Carine Lindquist Margarito PT DPT   Session Number   Session Number 3  medicare soc 2/24/2023   Progress Note/Recertification   Recertification Due Date 05/05/23   Adult Goals   PT Ortho Eval Goals 1;2;3   Ortho Goal 1   Goal Description Up/down stairs no pain no difficulty, no rail reciprocal stepping   Goal Progress a little tightness   Target Date 04/21/23   Ortho Goal 2   Goal Description no pain in morning when awakening   Goal Progress slight lingering   Target Date 04/07/23   Ortho Goal 3   Goal Description Independent in HEP incorporated into regular fitness routine   Target Date 05/05/23   Date Met 03/17/23   Subjective Report   Subjective Report hip continues to feel less painful evven with steps; exercises are going well   Therapeutic Procedure/exercise   Therapeutic Procedures: strength, endurance, ROM, flexibillity minutes (70662) 25   Skilled Intervention exercise instruction with manual facilitation, tactile and verbal cueing for proper technique   Patient Response cues for ex technique   Treatment Detail review [place]; instructed in [place]ole 23; lungkori review split squat   Progress progresses strengthening   Education   Learner Patient   Readiness Acceptance;Eager   Method Booklet/handout;Explanation;Demonstration   Response Demonstrates Understanding;Verbalizes Understanding   Education Comments vhi   Plan   Home program added above, continue previous   Updates to plan of care DC PT pt request   Comments   Comments wants to be done wth PT and continue ex on wonr   Total Session Time   Timed Code Treatment Minutes 25   Total Treatment Time (sum of timed and untimed services) 25   Medicare Claim Information   Medical Diagnosis r hip pain   PT Diagnosis R hip  pain   Start of Care Date 02/24/23   Onset date of current episode/exacerbation 02/09/23   Certification date from 02/24/23

## 2023-05-08 NOTE — PROGRESS NOTES
[facility]  [address]  [facility] [address]  [phone]  Dept: [phone]    PRE-OP EVALUATION:  Today's date: 2018    Gilda Petit (: 1955) presents for pre-operative evaluation assessment as requested by Dr. Holcomb.  She requires evaluation and anesthesia risk assessment prior to undergoing surgery/procedure for treatment of vaginal repair .    Proposed Surgery/ Procedure: COMBINED COLPORRHAPHY ANTERIOR, POSTERIOR  Date of Surgery/ Procedure: 18  Time of Surgery/ Procedure: 7:30 AM  Hospital/Surgical Facility: [facility]  Primary Physician: Gris Uribe  Type of Anesthesia Anticipated: General    Patient has a Health Care Directive or Living Will:  YES on file    1. NO - Do you have a history of heart attack, stroke, stent, bypass or surgery on an artery in the head, neck, heart or legs?  2. NO - Do you ever have any pain or discomfort in your chest?  3. NO - Do you have a history of  Heart Failure?  4. NO - Are you troubled by shortness of breath when: walking on the level, up a slight hill or at night?  5. NO - Do you currently have a cold, bronchitis or other respiratory infection?  6. NO - Do you have a cough, shortness of breath or wheezing?  7. NO - Do you sometimes get pains in the calves of your legs when you walk?  8. YES - Do you or anyone in your family have previous history of blood clots? Factor 5 in twin sister and brother and father  9. NO - Do you or does anyone in your family have a serious bleeding problem such as prolonged bleeding following surgeries or cuts?  10. NO - Have you ever had problems with anemia or been told to take iron pills?  11. NO - Have you had any abnormal blood loss such as black, tarry or bloody stools, or abnormal vaginal bleeding?  12. NO - Have you ever had a blood transfusion?  13. NO - Have you or any of your relatives ever had problems with anesthesia?  14. NO - Do you have sleep apnea, excessive snoring or  Patient alert and oriented X4, vitals stable. Denies pain. NPO maintained but continues with cyclic feeds per g-tube. Patient cleared for discharge. RN from [employer] and [employer] by to see patient to do bedside teaching for administering home feeds. Discharge instructions reviewed with patient at bedside including need to schedule follow up appointments; new, continued and discontinued medications; j-g tube care and when to seek medical attention. Patient verbalized understanding of dc instructions. All belongings with patient. Escorted to lobby by RN, escorted home by father. g-tube to gravity bag. daytime drowsiness?  15. NO - Do you have any prosthetic heart valves?  16. NO - Do you have prosthetic joints?  17. NO - Is there any chance that you may be pregnant?      HPI:     HPI related to upcoming procedure:   Patient has had worsening symptoms of vaginal prolapse that failed conservative management.  The prolapse also now involves the rectum and she's finding both voiding and defecation problematic.       See problem list for active medical problems.  Problems all longstanding and stable, except as noted/documented.  See ROS for pertinent symptoms related to these conditions.                                                                                                                                                          .    MEDICAL HISTORY:     Patient Active Problem List    Diagnosis Date Noted     Advance Care Planning 12/12/2011     Priority: Medium     Advance Care Planning 11/1/2016: Receipt of ACP document:  Received: Health Care Directive which was witnessed or notarized on 11/1/16.  Document not previously scanned.  Validation form completed and sent with document to be scanned.  Code Status needs to be updated to reflect choices in most recent ACP document.  Confirmed/documented designated decision maker(s).  Added by Brittney Ramirez RN, Advance Care Planning Liaison.  Advance Care Planning 12/12/11: Patient has completed an Advance/Health Care Directive (HCD), to bring in copy to be scanned into Epic. Xiomara Rhoades         Thyroid nodule 05/09/2011     Priority: Medium     5/9/2011  Repeat us in 1 year-- multiple small nodules        Major depression in complete remission (H) 11/29/2010     Priority: Medium     HYPERLIPIDEMIA LDL GOAL <130 10/31/2010     Priority: Medium     Contact dermatitis and other eczema, due to unspecified cause      Priority: Medium     Perioral dermatitis       Primary hypercoagulable state (H)      Priority: Medium     [employer] V Nanostellar ( Heterogygate)        Cystocele, midline 2005     Priority: Medium     With h/o incomplete voiding; taught to self cath; tried size 3 ring with support pessary in , partially improved her symptoms       Absence of menstruation 2004     Priority: Medium     Missed Menses        Past Medical History:   Diagnosis Date     Absence of menstruation 2004    Missed Menses     Bipolar disorder, unspecified     Ulices GRUBER [phone]-Mood disorder (not otherwise specified) consider bipolar, hypomania and not depression.     Contact dermatitis and other eczema, due to unspecified cause     Perioral dermatitis     Manic disorder, recurrent episode, moderate (H)     Mood disorder (not otherwise specified) consider bipolar, hypomania and not depression.     Primary hypercoagulable state (H)     Factory V Leiden ( Heterogygate)     Past Surgical History:   Procedure Laterality Date     COLONOSCOPY  05     COLONOSCOPY N/A 2016    Procedure: COLONOSCOPY;  Surgeon: Ehsan Hager MD;  Location: [facility] DILATION/CURETTAGE DIAG/THER NON OB      D & C     SURGICAL HISTORY OF -        x3     SURGICAL HISTORY OF -       Miscarriages x2     Current Outpatient Prescriptions   Medication Sig Dispense Refill     amitriptyline (ELAVIL) 25 MG tablet Take 4 tablets (100 mg) by mouth At Bedtime 360 tablet 3     amoxicillin (AMOXIL) 500 MG capsule Take 1 capsule (500 mg) by mouth daily 90 capsule 3     estradiol (VAGIFEM) 10 MCG TABS vaginal tablet Place 1 tab vaginally at bedtime daily for 5 days then twice a week until surgery 30 tablet 1     Omega-3 Fatty Acids (FISH OIL PO)        simvastatin (ZOCOR) 40 MG tablet Take 1 tablet (40 mg) by mouth At Bedtime 90 tablet 3     OTC products: None, except as noted above    Allergies   Allergen Reactions     Metal [Staples]      allergic to romario     Tetracycline Nausea     Adhesive Tape Rash      Latex Allergy: NO    Social History   Substance Use Topics     Smoking status:  "Never Smoker     Smokeless tobacco: Never Used     Alcohol use No     History   Drug Use No       REVIEW OF SYSTEMS:   Review Of Systems  Gen: no weight loss  Skin: negative  Eyes: negative  Ears/Nose/Throat: recent URI symptoms, mild swollen left anterior lymph node  Respiratory: No shortness of breath, dyspnea on exertion, cough, or hemoptysis  Cardiovascular: negative  Gastrointestinal: negative     Musculoskeletal: negative  Neurologic: negative  Psychiatric: negative  Hematologic/Lymphatic/Immunologic: positive for diaphoresis  Endocrine: negative      EXAM:   /56  Pulse 80  Temp 98.1  F (36.7  C) (Tympanic)  Resp 18  Ht 5' 1.5\" (1.562 m)  Wt 124 lb (56.2 kg)  SpO2 99%  Breastfeeding? No  BMI 23.05 kg/m2      Physical Exam   Constitutional: She is oriented to person, place, and time. She appears well-developed and well-nourished.   HENT:   Head: Normocephalic and atraumatic.   Eyes: Conjunctivae are normal. Pupils are equal, round, and reactive to light.   Neck: Neck supple.   Cardiovascular: Normal rate, regular rhythm and normal heart sounds.    Pulmonary/Chest: Effort normal and breath sounds normal.   Abdominal: Soft. She exhibits no mass.   Musculoskeletal: She exhibits no edema.   Neurological: She is alert and oriented to person, place, and time.   Skin: Skin is warm and dry.   Psychiatric: She has a normal mood and affect.   Nursing note and vitals reviewed.      DIAGNOSTICS:   No labs or EKG required for low risk surgery (cataract, skin procedure, breast biopsy, etc)      Patient requesting TSH today (due to diaphoresis)  Labs signed and held for surgery so will do BMP and CBC today.   No results for input(s): HGB, PLT, INR, NA, POTASSIUM, CR, A1C in the last 79244 hours.     IMPRESSION:   Reason for surgery/procedure: vaginal wall prolapse without uterine prolapse  Diagnosis/reason for consult: preoperative evaluation and medical risk assessment    The proposed surgical procedure is " considered INTERMEDIATE risk.    REVISED CARDIAC RISK INDEX  The patient has the following serious cardiovascular risks for perioperative complications such as (MI, PE, VFib and 3  AV Block):  No serious cardiac risks  INTERPRETATION: 0 risks: Class I (very low risk - 0.4% complication rate)    The patient has the following additional risks for perioperative complications:  No identified additional risks      ICD-10-CM    1. Preop general physical exam Z01.818    2. Need for prophylactic vaccination and inoculation against influenza Z23 FLU VACCINE, SPLIT VIRUS, IM (QUADRIVALENT) [62621]- >3 YRS     Vaccine Administration, Initial [21436]       RECOMMENDATIONS:         --Patient is to take all scheduled medications on the day of surgery EXCEPT for modifications listed below.    Anticoagulant or Antiplatelet Medication Use  ASPIRIN: Discontinue ASA 7-10 days prior to procedure to reduce bleeding risk.  It should be resumed post-operatively.        APPROVAL GIVEN to proceed with proposed procedure, without further diagnostic evaluation       Signed Electronically by: Gris Uribe MD    Copy of this evaluation report is provided to requesting physician.    [place] Preop Guidelines    Revised Cardiac Risk Index    Injectable Influenza Immunization Documentation    1.  Is the person to be vaccinated sick today?   No    2. Does the person to be vaccinated have an allergy to a component   of the vaccine?   No  Egg Allergy Algorithm Link    3. Has the person to be vaccinated ever had a serious reaction   to influenza vaccine in the past?   No    4. Has the person to be vaccinated ever had Guillain-Barré syndrome?   No    Form completed by Xiomara Rhoades MA

## 2023-09-11 ENCOUNTER — ANCILLARY PROCEDURE (OUTPATIENT)
Dept: MAMMOGRAPHY | Facility: CLINIC | Age: 68
End: 2023-09-11
Attending: FAMILY MEDICINE
Payer: MEDICARE

## 2023-09-11 DIAGNOSIS — Z12.31 VISIT FOR SCREENING MAMMOGRAM: ICD-10-CM

## 2023-09-11 PROCEDURE — 77067 SCR MAMMO BI INCL CAD: CPT | Mod: TC | Performed by: RADIOLOGY

## 2023-09-11 PROCEDURE — 77063 BREAST TOMOSYNTHESIS BI: CPT | Mod: TC | Performed by: RADIOLOGY

## 2023-09-15 ENCOUNTER — DOCUMENTATION ONLY (OUTPATIENT)
Dept: OTHER | Facility: CLINIC | Age: 68
End: 2023-09-15
Payer: MEDICARE

## 2024-01-02 ASSESSMENT — ENCOUNTER SYMPTOMS
SORE THROAT: 0
DYSURIA: 0
NAUSEA: 0
DIZZINESS: 0
EYE PAIN: 0
PALPITATIONS: 0
FREQUENCY: 0
DIARRHEA: 0
ARTHRALGIAS: 0
MYALGIAS: 0
JOINT SWELLING: 0
HEADACHES: 0
BREAST MASS: 0
PARESTHESIAS: 0
HEARTBURN: 0
WEAKNESS: 0
SHORTNESS OF BREATH: 0
FEVER: 0
CONSTIPATION: 0
NERVOUS/ANXIOUS: 0
HEMATURIA: 0
CHILLS: 0
COUGH: 0
HEMATOCHEZIA: 0
ABDOMINAL PAIN: 0

## 2024-01-02 ASSESSMENT — ACTIVITIES OF DAILY LIVING (ADL): CURRENT_FUNCTION: NO ASSISTANCE NEEDED

## 2024-02-12 SDOH — HEALTH STABILITY: PHYSICAL HEALTH: ON AVERAGE, HOW MANY MINUTES DO YOU ENGAGE IN EXERCISE AT THIS LEVEL?: 30 MIN

## 2024-02-12 SDOH — HEALTH STABILITY: PHYSICAL HEALTH: ON AVERAGE, HOW MANY DAYS PER WEEK DO YOU ENGAGE IN MODERATE TO STRENUOUS EXERCISE (LIKE A BRISK WALK)?: 2 DAYS

## 2024-02-12 ASSESSMENT — SOCIAL DETERMINANTS OF HEALTH (SDOH): HOW OFTEN DO YOU GET TOGETHER WITH FRIENDS OR RELATIVES?: TWICE A WEEK

## 2024-02-15 ENCOUNTER — OFFICE VISIT (OUTPATIENT)
Dept: FAMILY MEDICINE | Facility: CLINIC | Age: 69
End: 2024-02-15
Payer: MEDICARE

## 2024-02-15 ENCOUNTER — LAB (OUTPATIENT)
Dept: LAB | Facility: CLINIC | Age: 69
End: 2024-02-15
Payer: MEDICARE

## 2024-02-15 VITALS
OXYGEN SATURATION: 100 % | SYSTOLIC BLOOD PRESSURE: 120 MMHG | DIASTOLIC BLOOD PRESSURE: 60 MMHG | HEIGHT: 62 IN | HEART RATE: 79 BPM | BODY MASS INDEX: 24.84 KG/M2 | WEIGHT: 135 LBS | TEMPERATURE: 97 F | RESPIRATION RATE: 18 BRPM

## 2024-02-15 DIAGNOSIS — R73.01 ELEVATED FASTING GLUCOSE: ICD-10-CM

## 2024-02-15 DIAGNOSIS — E78.5 HYPERLIPIDEMIA LDL GOAL <130: ICD-10-CM

## 2024-02-15 DIAGNOSIS — D68.59 PRIMARY HYPERCOAGULABLE STATE (H): ICD-10-CM

## 2024-02-15 DIAGNOSIS — Z78.0 POSTMENOPAUSAL STATUS: ICD-10-CM

## 2024-02-15 DIAGNOSIS — Z00.00 ENCOUNTER FOR MEDICARE ANNUAL WELLNESS EXAM: ICD-10-CM

## 2024-02-15 DIAGNOSIS — Z00.00 ENCOUNTER FOR MEDICARE ANNUAL WELLNESS EXAM: Primary | ICD-10-CM

## 2024-02-15 DIAGNOSIS — F32.5 MAJOR DEPRESSIVE DISORDER WITH SINGLE EPISODE, IN FULL REMISSION (H): ICD-10-CM

## 2024-02-15 LAB
ALBUMIN SERPL BCG-MCNC: 4.7 G/DL (ref 3.5–5.2)
ALP SERPL-CCNC: 69 U/L (ref 40–150)
ALT SERPL W P-5'-P-CCNC: 17 U/L (ref 0–50)
ANION GAP SERPL CALCULATED.3IONS-SCNC: 9 MMOL/L (ref 7–15)
AST SERPL W P-5'-P-CCNC: 26 U/L (ref 0–45)
BILIRUB SERPL-MCNC: 0.7 MG/DL
BUN SERPL-MCNC: 17.8 MG/DL (ref 8–23)
CALCIUM SERPL-MCNC: 9.7 MG/DL (ref 8.8–10.2)
CHLORIDE SERPL-SCNC: 102 MMOL/L (ref 98–107)
CHOLEST SERPL-MCNC: 166 MG/DL
CREAT SERPL-MCNC: 0.82 MG/DL (ref 0.51–0.95)
DEPRECATED HCO3 PLAS-SCNC: 29 MMOL/L (ref 22–29)
EGFRCR SERPLBLD CKD-EPI 2021: 78 ML/MIN/1.73M2
FASTING STATUS PATIENT QL REPORTED: YES
GLUCOSE SERPL-MCNC: 96 MG/DL (ref 70–99)
HBA1C MFR BLD: 5.7 % (ref 0–5.6)
HDLC SERPL-MCNC: 64 MG/DL
LDLC SERPL CALC-MCNC: 90 MG/DL
NONHDLC SERPL-MCNC: 102 MG/DL
POTASSIUM SERPL-SCNC: 4.9 MMOL/L (ref 3.4–5.3)
PROT SERPL-MCNC: 7.6 G/DL (ref 6.4–8.3)
SODIUM SERPL-SCNC: 140 MMOL/L (ref 135–145)
TRIGL SERPL-MCNC: 59 MG/DL

## 2024-02-15 PROCEDURE — 90480 ADMN SARSCOV2 VAC 1/ONLY CMP: CPT | Performed by: FAMILY MEDICINE

## 2024-02-15 PROCEDURE — 83036 HEMOGLOBIN GLYCOSYLATED A1C: CPT

## 2024-02-15 PROCEDURE — G0439 PPPS, SUBSEQ VISIT: HCPCS | Performed by: FAMILY MEDICINE

## 2024-02-15 PROCEDURE — 36415 COLL VENOUS BLD VENIPUNCTURE: CPT

## 2024-02-15 PROCEDURE — 91320 SARSCV2 VAC 30MCG TRS-SUC IM: CPT | Performed by: FAMILY MEDICINE

## 2024-02-15 PROCEDURE — 80053 COMPREHEN METABOLIC PANEL: CPT

## 2024-02-15 PROCEDURE — 99214 OFFICE O/P EST MOD 30 MIN: CPT | Mod: 25 | Performed by: FAMILY MEDICINE

## 2024-02-15 PROCEDURE — 80061 LIPID PANEL: CPT

## 2024-02-15 RX ORDER — RESPIRATORY SYNCYTIAL VIRUS VACCINE 120MCG/0.5
0.5 KIT INTRAMUSCULAR ONCE
Qty: 1 EACH | Refills: 0 | Status: CANCELLED | OUTPATIENT
Start: 2024-02-15 | End: 2024-02-15

## 2024-02-15 RX ORDER — SIMVASTATIN 40 MG
40 TABLET ORAL AT BEDTIME
Qty: 90 TABLET | Refills: 3 | Status: SHIPPED | OUTPATIENT
Start: 2024-02-15

## 2024-02-15 ASSESSMENT — PATIENT HEALTH QUESTIONNAIRE - PHQ9
10. IF YOU CHECKED OFF ANY PROBLEMS, HOW DIFFICULT HAVE THESE PROBLEMS MADE IT FOR YOU TO DO YOUR WORK, TAKE CARE OF THINGS AT HOME, OR GET ALONG WITH OTHER PEOPLE: NOT DIFFICULT AT ALL
SUM OF ALL RESPONSES TO PHQ QUESTIONS 1-9: 0
5. POOR APPETITE OR OVEREATING: NOT AT ALL
SUM OF ALL RESPONSES TO PHQ QUESTIONS 1-9: 0

## 2024-02-15 ASSESSMENT — ANXIETY QUESTIONNAIRES
GAD7 TOTAL SCORE: 0
6. BECOMING EASILY ANNOYED OR IRRITABLE: NOT AT ALL
5. BEING SO RESTLESS THAT IT IS HARD TO SIT STILL: NOT AT ALL
GAD7 TOTAL SCORE: 0
3. WORRYING TOO MUCH ABOUT DIFFERENT THINGS: NOT AT ALL
2. NOT BEING ABLE TO STOP OR CONTROL WORRYING: NOT AT ALL
1. FEELING NERVOUS, ANXIOUS, OR ON EDGE: NOT AT ALL
7. FEELING AFRAID AS IF SOMETHING AWFUL MIGHT HAPPEN: NOT AT ALL

## 2024-02-15 ASSESSMENT — ACTIVITIES OF DAILY LIVING (ADL): CURRENT_FUNCTION: NO ASSISTANCE NEEDED

## 2024-02-15 ASSESSMENT — PAIN SCALES - GENERAL: PAINLEVEL: NO PAIN (0)

## 2024-02-15 NOTE — PROGRESS NOTES
"Preventive Care Visit  [facility]  Gris Uribe MD, Family Medicine  Feb 15, 2024    Assessment & Plan     Encounter for Medicare annual wellness exam     - Comprehensive metabolic panel (BMP + Alb, Alk Phos, ALT, AST, Total. Bili, TP); Future    Hyperlipidemia LDL goal <130  Continue statin, check lipids  - Lipid panel reflex to direct LDL Fasting; Future  - simvastatin (ZOCOR) 40 MG tablet; Take 1 tablet (40 mg) by mouth at bedtime  - Comprehensive metabolic panel (BMP + Alb, Alk Phos, ALT, AST, Total. Bili, TP); Future    Major depressive disorder with single episode, in full remission (H24)  Stable, well managed on current, longstanding treatment.   - amitriptyline (ELAVIL) 25 MG tablet; Take 5 tablets (125 mg) by mouth at bedtime    Primary hypercoagulable state (H24)  Factor V heterozygous    Elevated fasting glucose  Continue monitoring, continue making dietary changes  - Comprehensive metabolic panel (BMP + Alb, Alk Phos, ALT, AST, Total. Bili, TP); Future  - Hemoglobin A1c; Future    Postmenopausal status  Last dexa 2013 and she was close to osteoporosis at the time, needs re-evaluation  - DX Hip/Pelvis/Spine; Future            BMI  Estimated body mass index is 25.09 kg/m  as calculated from the following:    Height as of this encounter: 1.562 m (5' 1.5\").    Weight as of this encounter: 61.2 kg (135 lb).       Counseling  Appropriate preventive services were discussed with this patient, including applicable screening as appropriate for fall prevention, nutrition, physical activity, Tobacco-use cessation, weight loss and cognition.  Checklist reviewing preventive services available has been given to the patient.  Reviewed patient's diet, addressing concerns and/or questions.   She is at risk for lack of exercise and has been provided with information to increase physical activity for the benefit of her well-being.   The patient was instructed to see the dentist every 6 months. " "  She is at risk for psychosocial distress and has been provided with information to reduce risk.   Patient reported safety concerns were addressed today.  Patient has been advised of split billing requirements and indicates understanding: Yes        Pee Vallecillo is a 68 year old, presenting for the following:  Medicare Visit        2/15/2024     9:45 AM   Additional Questions   Roomed by Kristina MAJOR CMA   Accompanied by Self         Health Care Directive  Patient has a Health Care Directive on file  Advance care planning document is on file and is current.    Healthy Habits:     In general, how would you rate your overall health?  Good    Frequency of exercise:  2-3 days/week    Duration of exercise:  15-30 minutes    Do you usually eat at least 4 servings of fruit and vegetables a day, include whole grains    & fiber and avoid regularly eating high fat or \"junk\" foods?  No    Taking medications regularly:  Yes    Medication side effects:  None    Ability to successfully perform activities of daily living:  No assistance needed    Home Safety:  Throw rugs in the hallway and lack of grab bars in the bathroom    Hearing Impairment:  No hearing concerns    In the past 6 months, have you been bothered by leaking of urine?  No    In general, how would you rate your overall mental or emotional health?  Good    Additional concerns today:  No        Hyperlipidemia Follow-Up  Simvastatin 40mg qd  Are you regularly taking any medication or supplement to lower your cholesterol?   Yes- statin  Are you having muscle aches or other side effects that you think could be caused by your cholesterol lowering medication?  No    Depression Followup  Amitriptyline 125mg qhs  How are you doing with your depression since your last visit? No change  Are you having other symptoms that might be associated with depression? No  Have you had a significant life event?  Grand daughter had kidney cancer   Are you feeling anxious or having panic " attacks?   No  Do you have any concerns with your use of alcohol or other drugs? No    Social History     Tobacco Use    Smoking status: Never    Smokeless tobacco: Never   Vaping Use    Vaping Use: Never used   Substance Use Topics    Alcohol use: Yes     Comment: occ    Drug use: No         9/17/2021    10:10 AM 2/9/2023    10:08 AM 2/15/2024     9:40 AM   PHQ   PHQ-9 Total Score 0 0 0   Q9: Thoughts of better off dead/self-harm past 2 weeks Not at all Not at all Not at all         10/26/2016     3:44 PM 7/11/2019    11:21 AM 2/15/2024     9:51 AM   MENDEZ-7 SCORE   Total Score 0 0 0         2/15/2024     9:40 AM   Last PHQ-9   1.  Little interest or pleasure in doing things 0   2.  Feeling down, depressed, or hopeless 0   3.  Trouble falling or staying asleep, or sleeping too much 0   4.  Feeling tired or having little energy 0   5.  Poor appetite or overeating 0   6.  Feeling bad about yourself 0   7.  Trouble concentrating 0   8.  Moving slowly or restless 0   Q9: Thoughts of better off dead/self-harm past 2 weeks 0   PHQ-9 Total Score 0         2/15/2024     9:51 AM   MENDEZ-7    1. Feeling nervous, anxious, or on edge 0   2. Not being able to stop or control worrying 0   3. Worrying too much about different things 0   4. Trouble relaxing 0   5. Being so restless that it is hard to sit still 0   6. Becoming easily annoyed or irritable 0   7. Feeling afraid, as if something awful might happen 0   MENDEZ-7 Total Score 0       Suicide Assessment Five-step Evaluation and Treatment (SAFE-T)          2/12/2024   General Health   How would you rate your overall physical health? Good   Feel stress (tense, anxious, or unable to sleep) Only a little   (!) STRESS CONCERN      2/12/2024   Nutrition   Diet: Low fat/cholesterol         2/12/2024   Exercise   Days per week of moderate/strenous exercise 2 days   Average minutes spent exercising at this level 30 min   (!) EXERCISE CONCERN      2/12/2024   Social Factors   Frequency of  gathering with friends or relatives Twice a week   Worry food won't last until get money to buy more No    No   Food not last or not have enough money for food? No    No   Do you have housing?  Yes    Yes   Are you worried about losing your housing? No    No   Lack of transportation? No    No   Unable to get utilities (heat,electricity)? No    No         2/12/2024   Fall Risk   Fallen 2 or more times in the past year? No   Trouble with walking or balance? No          2/12/2024   Activities of Daily Living- Home Safety   Needs help with the following daily activites None of the above   Safety concerns in the home Throw rugs in the hallway         2/12/2024   Dental   Dentist two times every year? (!) NO         2/12/2024   Hearing Screening   Hearing concerns? None of the above         2/12/2024   Driving Risk Screening   Patient/family members have concerns about driving No         2/12/2024   General Alertness/Fatigue Screening   Have you been more tired than usual lately? No         2/12/2024   Urinary Incontinence Screening   Bothered by leaking urine in past 6 months No         2/12/2024   TB Screening   Were you born outside of [place]?  No       Today's PHQ-9 Score:       2/15/2024     9:40 AM   PHQ-9 SCORE   PHQ-9 Total Score MyChart 0   PHQ-9 Total Score 0         2/12/2024   Substance Use   Alcohol more than 3/day or more than 7/wk No   Do you have a current opioid prescription? No   How severe/bad is pain from 1 to 10? 0/10 (No Pain)   Do you use any other substances recreationally? No     Social History     Tobacco Use    Smoking status: Never    Smokeless tobacco: Never   Vaping Use    Vaping Use: Never used   Substance Use Topics    Alcohol use: Yes     Comment: occ    Drug use: No            No data to display                 Mammogram Screening - Mammogram every 1-2 years updated in Health Maintenance based on mutual decision making    The 10-year ASCVD risk score (David DOS SANTOS, et al., 2019) is: 5.4%     Values used to calculate the score:      Age: 68 years      Sex: Female      Is Non- : No      Diabetic: No      Tobacco smoker: No      Systolic Blood Pressure: 120 mmHg      Is BP treated: No      HDL Cholesterol: 88 mg/dL      Total Cholesterol: 214 mg/dL    Fracture Risk Assessment Tool  Link to Frax Calculator  Use the information below to complete the Frax calculator  : 1955  Sex: female  Weight (kg): 61.2 kg (actual weight)  Height (cm): 156.2 cm  Previous Fragility Fracture:  No  History of parent with fractured hip:  No  Current Smoking:  No  Patient has been on glucocorticoids for more than 3 months (5mg/day or more): No  Rheumatoid Arthritis on Problem List:  No  Secondary Osteoporosis on Problem List:  No  Consumes 3 or more units of alcohol per day: No  Femoral Neck BMD (g/cm2)            Reviewed and updated as needed this visit by Provider                      Current providers sharing in care for this patient include:  Patient Care Team:  Gris Uribe MD as PCP - General (Family Practice)  Gris Uribe MD as Assigned PCP    The following health maintenance items are reviewed in Epic and correct as of today:  Health Maintenance   Topic Date Due    RSV VACCINE (Pregnancy & 60+) (1 - 1-dose 60+ series) Never done    GLUCOSE  2023    COVID-19 Vaccine (3 - -24 season) 2023    LIPID  2024    ANNUAL REVIEW OF HM ORDERS  2024    DTAP/TDAP/TD IMMUNIZATION (2 - Td or Tdap) 2024    PHQ-9  08/15/2024    MEDICARE ANNUAL WELLNESS VISIT  02/15/2025    FALL RISK ASSESSMENT  02/15/2025    MAMMO SCREENING  2025    COLORECTAL CANCER SCREENING  2026    DEXA  2028    ADVANCE CARE PLANNING  02/15/2029    HEPATITIS C SCREENING  Completed    DEPRESSION ACTION PLAN  Completed    INFLUENZA VACCINE  Completed    Pneumococcal Vaccine: 65+ Years  Completed    ZOSTER IMMUNIZATION  Completed    IPV IMMUNIZATION  Aged Out    HPV IMMUNIZATION  " Aged Out    MENINGITIS IMMUNIZATION  Aged Out    RSV MONOCLONAL ANTIBODY  Aged Out    PAP  Discontinued         Review of Systems  Constitutional, HEENT, cardiovascular, pulmonary, gi and gu systems are negative, except as otherwise noted.     Objective    Exam  /60   Pulse 79   Temp 97  F (36.1  C) (Tympanic)   Resp 18   Ht 1.562 m (5' 1.5\")   Wt 61.2 kg (135 lb)   LMP  (LMP Unknown)   SpO2 100%   BMI 25.09 kg/m     Estimated body mass index is 25.09 kg/m  as calculated from the following:    Height as of this encounter: 1.562 m (5' 1.5\").    Weight as of this encounter: 61.2 kg (135 lb).    Physical Exam  GENERAL: alert and no distress  NECK: no adenopathy, no asymmetry, masses, or scars  RESP: lungs clear to auscultation - no rales, rhonchi or wheezes  BREAST: normal without masses, tenderness or nipple discharge and no palpable axillary masses or adenopathy  CV: regular rate and rhythm, normal S1 S2, no S3 or S4, no murmur, click or rub, no peripheral edema  ABDOMEN: soft, nontender, no hepatosplenomegaly, no masses and bowel sounds normal  MS: no gross musculoskeletal defects noted, no edema  SKIN: no suspicious lesions or rashes  NEURO: Normal strength and tone, mentation intact and speech normal  PSYCH: mentation appears normal, affect normal/bright         2/15/2024   Mini Cog   Clock Draw Score 2 Normal   3 Item Recall 3 objects recalled   Mini Cog Total Score 5            Signed Electronically by: Gris Uribe MD    "

## 2024-02-15 NOTE — PATIENT INSTRUCTIONS
Preventive Care Advice   This is general advice given by our system to help you stay healthy. However, your care team may have specific advice just for you. Please talk to your care team about your preventive care needs.  Nutrition  Eat 5 or more servings of fruits and vegetables each day.  Try wheat bread, brown rice and whole grain pasta (instead of white bread, rice, and pasta).  Get enough calcium and vitamin D. Check the label on foods and aim for 100% of the RDA (recommended daily allowance).  Lifestyle  Exercise at least 150 minutes each week  (30 minutes a day, 5 days a week).  Do muscle strengthening activities 2 days a week. These help control your weight and prevent disease.  No smoking.  Wear sunscreen to prevent skin cancer.  Have a dental exam and cleaning every 6 months.  Yearly exams  See your health care team every year to talk about:  Any changes in your health.  Any medicines your care team has prescribed.  Preventive care, family planning, and ways to prevent chronic diseases.  Shots (vaccines)   HPV shots (up to age 26), if you've never had them before.  Hepatitis B shots (up to age 59), if you've never had them before.  COVID-19 shot: Get this shot when it's due.  Flu shot: Get a flu shot every year.  Tetanus shot: Get a tetanus shot every 10 years.  Pneumococcal, hepatitis A, and RSV shots: Ask your care team if you need these based on your risk.  Shingles shot (for age 50 and up)  General health tests  Diabetes screening:  Starting at age 35, Get screened for diabetes at least every 3 years.  If you are younger than age 35, ask your care team if you should be screened for diabetes.  Cholesterol test: At age 39, start having a cholesterol test every 5 years, or more often if advised.  Bone density scan (DEXA): At age 50, ask your care team if you should have this scan for osteoporosis (brittle bones).  Hepatitis C: Get tested at least once in your life.  STIs (sexually transmitted  infections)  Before age 24: Ask your care team if you should be screened for STIs.  After age 24: Get screened for STIs if you're at risk. You are at risk for STIs (including HIV) if:  You are sexually active with more than one person.  You don't use condoms every time.  You or a partner was diagnosed with a sexually transmitted infection.  If you are at risk for HIV, ask about PrEP medicine to prevent HIV.  Get tested for HIV at least once in your life, whether you are at risk for HIV or not.  Cancer screening tests  Cervical cancer screening: If you have a cervix, begin getting regular cervical cancer screening tests starting at age 21.  Breast cancer scan (mammogram): If you've ever had breasts, begin having regular mammograms starting at age 40. This is a scan to check for breast cancer.  Colon cancer screening: It is important to start screening for colon cancer at age 45.  Have a colonoscopy test every 10 years (or more often if you're at risk) Or, ask your provider about stool tests like a FIT test every year or Cologuard test every 3 years.  To learn more about your testing options, visit:   [email or web address].  For help making a decision, visit:   https://bit.ly/jo23234.  Prostate cancer screening test: If you have a prostate, ask your care team if a prostate cancer screening test (PSA) at age 55 is right for you.  Lung cancer screening: If you are a current or former smoker ages 50 to 80, ask your care team if ongoing lung cancer screenings are right for you.  For informational purposes only. Not to replace the advice of your health care provider. Copyright   2023 [facility]. All rights reserved. Clinically reviewed by the [facility]. transOMIC 292932 - REV 01/24.    Learning About Activities of Daily Living  What are activities of daily living?     Activities of daily living (ADLs) are the basic self-care tasks you do every day. As you age, and if  you have health problems, you may find that it's harder to do these things for yourself. That's when you may need some help.  Your doctor uses ADLs to measure how much help you need. Knowing what you can and can't do for yourself is an important first step to getting help. And when you have the help you need, you can stay as independent as possible.  Your doctor will want to know if you are able to do tasks such as:  Take a bath or shower without help.  Go to the bathroom by yourself.  Dress and undress without help.  Shave, comb your hair, and brush teeth on your own.  Get in and out of bed or a chair without help.  Feed yourself without help.  If you are having trouble doing basic self-care tasks, talk with your doctor. You may want to bring a caregiver or family member who can help the doctor understand your needs and abilities.  How will a doctor assess your ADLs?  Asking about ADLs is part of a routine health checkup your doctor will likely do as you age. Your health check might be done in a doctor's office, in your home, or at a hospital. The goal is to find out if you are having any problems that could make your health problems worse or that make it unsafe for you to be on your own.  To measure your ADLs, your doctor will ask how hard it is for you to do routine tasks. He or she may also want to know if you have changed the way you do a task because of a health problem. He or she may watch how you:  Walk back and forth.  Keep your balance while you stand or walk.  Move from sitting to standing or from a bed to a chair.  Button or unbutton a shirt or sweater.  Remove and put on your shoes.  It's normal to feel a little worried or anxious if you find you can't do all the things you used to be able to do. Talking with your doctor about ADLs isn't a test that you either pass or fail. It's just a way to get more information about your health and safety.  Follow-up care is a key part of your treatment and safety.  Be sure to make and go to all appointments, and call your doctor if you are having problems. It's also a good idea to know your test results and keep a list of the medicines you take.  Current as of: February 26, 2023               Content Version: 13.8    5501-2406 [employer].   Care instructions adapted under license by your healthcare professional. If you have questions about a medical condition or this instruction, always ask your healthcare professional. [employer] disclaims any warranty or liability for your use of this information.      Learning About Stress  What is stress?     Stress is your body's response to a hard situation. Your body can have a physical, emotional, or mental response. Stress is a fact of life for most people, and it affects everyone differently. What causes stress for you may not be stressful for someone else.  A lot of things can cause stress. You may feel stress when you go on a job interview, take a test, or run a race. This kind of short-term stress is normal and even useful. It can help you if you need to work hard or react quickly. For example, stress can help you finish an important job on time.  Long-term stress is caused by ongoing stressful situations or events. Examples of long-term stress include long-term health problems, ongoing problems at work, or conflicts in your family. Long-term stress can harm your health.  How does stress affect your health?  When you are stressed, your body responds as though you are in danger. It makes hormones that speed up your heart, make you breathe faster, and give you a burst of energy. This is called the fight-or-flight stress response. If the stress is over quickly, your body goes back to normal and no harm is done.  But if stress happens too often or lasts too long, it can have bad effects. Long-term stress can make you more likely to get sick, and it can make symptoms of some diseases worse. If you tense up  when you are stressed, you may develop neck, shoulder, or low back pain. Stress is linked to high blood pressure and heart disease.  Stress also harms your emotional health. It can make you parikh, tense, or depressed. Your relationships may suffer, and you may not do well at work or school.  What can you do to manage stress?  You can try these things to help manage stress:   Do something active. Exercise or activity can help reduce stress. Walking is a great way to get started. Even everyday activities such as housecleaning or yard work can help.  Try yoga or rosendo chi. These techniques combine exercise and meditation. You may need some training at first to learn them.  Do something you enjoy. For example, listen to music or go to a movie. Practice your hobby or do volunteer work.  Meditate. This can help you relax, because you are not worrying about what happened before or what may happen in the future.  Do guided imagery. Imagine yourself in any setting that helps you feel calm. You can use online videos, books, or a teacher to guide you.  Do breathing exercises. For example:  From a standing position, bend forward from the waist with your knees slightly bent. Let your arms dangle close to the floor.  Breathe in slowly and deeply as you return to a standing position. Roll up slowly and lift your head last.  Hold your breath for just a few seconds in the standing position.  Breathe out slowly and bend forward from the waist.  Let your feelings out. Talk, laugh, cry, and express anger when you need to. Talking with supportive friends or family, a counselor, or a eva leader about your feelings is a healthy way to relieve stress. Avoid discussing your feelings with people who make you feel worse.  Write. It may help to write about things that are bothering you. This helps you find out how much stress you feel and what is causing it. When you know this, you can find better ways to cope.  What can you do to prevent  "stress?  You might try some of these things to help prevent stress:  Manage your time. This helps you find time to do the things you want and need to do.  Get enough sleep. Your body recovers from the stresses of the day while you are sleeping.  Get support. Your family, friends, and community can make a difference in how you experience stress.  Limit your news feed. Avoid or limit time on social media or news that may make you feel stressed.  Do something active. Exercise or activity can help reduce stress. Walking is a great way to get started.  Where can you learn more?  Go to https://www.anchor.travel.net/patiented  Enter N032 in the search box to learn more about \"Learning About Stress.\"  Current as of: February 26, 2023               Content Version: 13.8    3798-4156 [employer].   Care instructions adapted under license by your healthcare professional. If you have questions about a medical condition or this instruction, always ask your healthcare professional. [employer] disclaims any warranty or liability for your use of this information.      "

## 2024-11-26 ENCOUNTER — TELEPHONE (OUTPATIENT)
Dept: FAMILY MEDICINE | Facility: CLINIC | Age: 69
End: 2024-11-26
Payer: MEDICARE

## 2024-11-26 NOTE — TELEPHONE ENCOUNTER
Form completed for Medicare Part D Tier Exception for Amitriptyline faxed back to [employer] - Copy to scanning.

## 2025-01-27 ENCOUNTER — ANCILLARY PROCEDURE (OUTPATIENT)
Dept: MAMMOGRAPHY | Facility: CLINIC | Age: 70
End: 2025-01-27
Attending: FAMILY MEDICINE
Payer: MEDICARE

## 2025-01-27 DIAGNOSIS — Z12.31 VISIT FOR SCREENING MAMMOGRAM: ICD-10-CM

## 2025-01-27 PROCEDURE — 77067 SCR MAMMO BI INCL CAD: CPT | Mod: TC | Performed by: RADIOLOGY

## 2025-01-27 PROCEDURE — 77063 BREAST TOMOSYNTHESIS BI: CPT | Mod: TC | Performed by: RADIOLOGY

## 2025-02-23 SDOH — HEALTH STABILITY: PHYSICAL HEALTH: ON AVERAGE, HOW MANY DAYS PER WEEK DO YOU ENGAGE IN MODERATE TO STRENUOUS EXERCISE (LIKE A BRISK WALK)?: 2 DAYS

## 2025-02-23 SDOH — HEALTH STABILITY: PHYSICAL HEALTH: ON AVERAGE, HOW MANY MINUTES DO YOU ENGAGE IN EXERCISE AT THIS LEVEL?: 60 MIN

## 2025-02-23 ASSESSMENT — SOCIAL DETERMINANTS OF HEALTH (SDOH): HOW OFTEN DO YOU GET TOGETHER WITH FRIENDS OR RELATIVES?: TWICE A WEEK

## 2025-02-25 ENCOUNTER — OFFICE VISIT (OUTPATIENT)
Dept: FAMILY MEDICINE | Facility: CLINIC | Age: 70
End: 2025-02-25
Attending: FAMILY MEDICINE
Payer: MEDICARE

## 2025-02-25 VITALS
WEIGHT: 134.38 LBS | HEART RATE: 82 BPM | OXYGEN SATURATION: 98 % | HEIGHT: 62 IN | DIASTOLIC BLOOD PRESSURE: 70 MMHG | BODY MASS INDEX: 24.73 KG/M2 | TEMPERATURE: 97.2 F | SYSTOLIC BLOOD PRESSURE: 126 MMHG

## 2025-02-25 DIAGNOSIS — F32.5 MAJOR DEPRESSIVE DISORDER WITH SINGLE EPISODE, IN FULL REMISSION: ICD-10-CM

## 2025-02-25 DIAGNOSIS — Z00.00 ENCOUNTER FOR MEDICARE ANNUAL WELLNESS EXAM: Primary | ICD-10-CM

## 2025-02-25 DIAGNOSIS — R73.01 ELEVATED FASTING GLUCOSE: ICD-10-CM

## 2025-02-25 DIAGNOSIS — Z78.0 POSTMENOPAUSAL STATUS: ICD-10-CM

## 2025-02-25 DIAGNOSIS — E78.5 HYPERLIPIDEMIA LDL GOAL <130: ICD-10-CM

## 2025-02-25 LAB
ALBUMIN SERPL BCG-MCNC: 4.8 G/DL (ref 3.5–5.2)
ALP SERPL-CCNC: 69 U/L (ref 40–150)
ALT SERPL W P-5'-P-CCNC: 24 U/L (ref 0–50)
ANION GAP SERPL CALCULATED.3IONS-SCNC: 10 MMOL/L (ref 7–15)
AST SERPL W P-5'-P-CCNC: 29 U/L (ref 0–45)
BILIRUB SERPL-MCNC: 0.7 MG/DL
BUN SERPL-MCNC: 18.5 MG/DL (ref 8–23)
CALCIUM SERPL-MCNC: 9.9 MG/DL (ref 8.8–10.4)
CHLORIDE SERPL-SCNC: 102 MMOL/L (ref 98–107)
CHOLEST SERPL-MCNC: 222 MG/DL
CREAT SERPL-MCNC: 0.91 MG/DL (ref 0.51–0.95)
EGFRCR SERPLBLD CKD-EPI 2021: 68 ML/MIN/1.73M2
EST. AVERAGE GLUCOSE BLD GHB EST-MCNC: 117 MG/DL
FASTING STATUS PATIENT QL REPORTED: YES
FASTING STATUS PATIENT QL REPORTED: YES
GLUCOSE SERPL-MCNC: 97 MG/DL (ref 70–99)
HBA1C MFR BLD: 5.7 % (ref 0–5.6)
HCO3 SERPL-SCNC: 29 MMOL/L (ref 22–29)
HDLC SERPL-MCNC: 74 MG/DL
LDLC SERPL CALC-MCNC: 127 MG/DL
NONHDLC SERPL-MCNC: 148 MG/DL
POTASSIUM SERPL-SCNC: 4.5 MMOL/L (ref 3.4–5.3)
PROT SERPL-MCNC: 7.7 G/DL (ref 6.4–8.3)
SODIUM SERPL-SCNC: 141 MMOL/L (ref 135–145)
TRIGL SERPL-MCNC: 107 MG/DL

## 2025-02-25 PROCEDURE — 80061 LIPID PANEL: CPT | Performed by: FAMILY MEDICINE

## 2025-02-25 PROCEDURE — 99214 OFFICE O/P EST MOD 30 MIN: CPT | Mod: 25 | Performed by: FAMILY MEDICINE

## 2025-02-25 PROCEDURE — G0439 PPPS, SUBSEQ VISIT: HCPCS | Performed by: FAMILY MEDICINE

## 2025-02-25 PROCEDURE — 80053 COMPREHEN METABOLIC PANEL: CPT | Performed by: FAMILY MEDICINE

## 2025-02-25 PROCEDURE — 83036 HEMOGLOBIN GLYCOSYLATED A1C: CPT | Performed by: FAMILY MEDICINE

## 2025-02-25 PROCEDURE — 36415 COLL VENOUS BLD VENIPUNCTURE: CPT | Performed by: FAMILY MEDICINE

## 2025-02-25 RX ORDER — SIMVASTATIN 40 MG
40 TABLET ORAL AT BEDTIME
Qty: 90 TABLET | Refills: 3 | Status: SHIPPED | OUTPATIENT
Start: 2025-02-25

## 2025-02-25 ASSESSMENT — ANXIETY QUESTIONNAIRES
2. NOT BEING ABLE TO STOP OR CONTROL WORRYING: SEVERAL DAYS
GAD7 TOTAL SCORE: 3
3. WORRYING TOO MUCH ABOUT DIFFERENT THINGS: SEVERAL DAYS
5. BEING SO RESTLESS THAT IT IS HARD TO SIT STILL: NOT AT ALL
1. FEELING NERVOUS, ANXIOUS, OR ON EDGE: NOT AT ALL
GAD7 TOTAL SCORE: 3
7. FEELING AFRAID AS IF SOMETHING AWFUL MIGHT HAPPEN: SEVERAL DAYS
6. BECOMING EASILY ANNOYED OR IRRITABLE: NOT AT ALL

## 2025-02-25 ASSESSMENT — PATIENT HEALTH QUESTIONNAIRE - PHQ9
SUM OF ALL RESPONSES TO PHQ QUESTIONS 1-9: 1
5. POOR APPETITE OR OVEREATING: NOT AT ALL
SUM OF ALL RESPONSES TO PHQ QUESTIONS 1-9: 1
10. IF YOU CHECKED OFF ANY PROBLEMS, HOW DIFFICULT HAVE THESE PROBLEMS MADE IT FOR YOU TO DO YOUR WORK, TAKE CARE OF THINGS AT HOME, OR GET ALONG WITH OTHER PEOPLE: NOT DIFFICULT AT ALL

## 2025-02-25 ASSESSMENT — PAIN SCALES - GENERAL: PAINLEVEL_OUTOF10: NO PAIN (0)

## 2025-02-25 NOTE — PATIENT INSTRUCTIONS
Patient Education   Preventive Care Advice   This is general advice given by our system to help you stay healthy. However, your care team may have specific advice just for you. Please talk to your care team about your preventive care needs.  Nutrition  Eat 5 or more servings of fruits and vegetables each day.  Try wheat bread, brown rice and whole grain pasta (instead of white bread, rice, and pasta).  Get enough calcium and vitamin D. Check the label on foods and aim for 100% of the RDA (recommended daily allowance).  Lifestyle  Exercise at least 150 minutes each week  (30 minutes a day, 5 days a week).  Do muscle strengthening activities 2 days a week. These help control your weight and prevent disease.  No smoking.  Wear sunscreen to prevent skin cancer.  Have a dental exam and cleaning every 6 months.  Yearly exams  See your health care team every year to talk about:  Any changes in your health.  Any medicines your care team has prescribed.  Preventive care, family planning, and ways to prevent chronic diseases.  Shots (vaccines)   HPV shots (up to age 26), if you've never had them before.  Hepatitis B shots (up to age 59), if you've never had them before.  COVID-19 shot: Get this shot when it's due.  Flu shot: Get a flu shot every year.  Tetanus shot: Get a tetanus shot every 10 years.  Pneumococcal, hepatitis A, and RSV shots: Ask your care team if you need these based on your risk.  Shingles shot (for age 50 and up)  General health tests  Diabetes screening:  Starting at age 35, Get screened for diabetes at least every 3 years.  If you are younger than age 35, ask your care team if you should be screened for diabetes.  Cholesterol test: At age 39, start having a cholesterol test every 5 years, or more often if advised.  Bone density scan (DEXA): At age 50, ask your care team if you should have this scan for osteoporosis (brittle bones).  Hepatitis C: Get tested at least once in your life.  STIs (sexually  transmitted infections)  Before age 24: Ask your care team if you should be screened for STIs.  After age 24: Get screened for STIs if you're at risk. You are at risk for STIs (including HIV) if:  You are sexually active with more than one person.  You don't use condoms every time.  You or a partner was diagnosed with a sexually transmitted infection.  If you are at risk for HIV, ask about PrEP medicine to prevent HIV.  Get tested for HIV at least once in your life, whether you are at risk for HIV or not.  Cancer screening tests  Cervical cancer screening: If you have a cervix, begin getting regular cervical cancer screening tests starting at age 21.  Breast cancer scan (mammogram): If you've ever had breasts, begin having regular mammograms starting at age 40. This is a scan to check for breast cancer.  Colon cancer screening: It is important to start screening for colon cancer at age 45.  Have a colonoscopy test every 10 years (or more often if you're at risk) Or, ask your provider about stool tests like a FIT test every year or Cologuard test every 3 years.  To learn more about your testing options, visit:   .  For help making a decision, visit:   https://bit.ly/cz42468.  Prostate cancer screening test: If you have a prostate, ask your care team if a prostate cancer screening test (PSA) at age 55 is right for you.  Lung cancer screening: If you are a current or former smoker ages 50 to 80, ask your care team if ongoing lung cancer screenings are right for you.  For informational purposes only. Not to replace the advice of your health care provider. Copyright   2023 [facility]. All rights reserved. Clinically reviewed by the [facility] Transitions Program. Fieldbook [id number] - REV 01/24.  Learning About Activities of Daily Living  What are activities of daily living?     Activities of daily living (ADLs) are the basic self-care tasks you do every day. These include eating, bathing, dressing,  and moving around.  As you age, and if you have health problems, you may find that it's harder to do some of these tasks. If so, your doctor can suggest ideas that may help.  To measure what kind of help you may need, your doctor will ask how well you are able to do ADLs. Let your doctor know if there are any tasks that you are having trouble doing. This is an important first step to getting help. And when you have the help you need, you can stay as independent as possible.  How will a doctor assess your ADLs?  Asking about ADLs is part of a routine health checkup your doctor will likely do as you age. Your health check might be done in a doctor's office, in your home, or at a hospital. The goal is to find out if you are having any problems that could make it hard to care for yourself or that make it unsafe for you to be on your own.  To measure your ADLs, your doctor will ask how hard it is for you to do routine tasks. Your doctor may also want to know if you have changed the way you do a task because of a health problem. Your doctor may watch how you:  Walk back and forth.  Keep your balance while you stand or walk.  Move from sitting to standing or from a bed to a chair.  Button or unbutton a shirt or sweater.  Remove and put on your shoes.  It's common to feel a little worried or anxious if you find you can't do all the things you used to be able to do. Talking with your doctor about ADLs is a way to make sure you're as safe as possible and able to care for yourself as well as you can. You may want to bring a caregiver, friend, or family member to your checkup. They can help you talk to your doctor.  Follow-up care is a key part of your treatment and safety. Be sure to make and go to all appointments, and call your doctor if you are having problems. It's also a good idea to know your test results and keep a list of the medicines you take.  Current as of: October 24, 2023  Content Version: 14.3    2024 [facility]  [employer].   Care instructions adapted under license by your healthcare professional. If you have questions about a medical condition or this instruction, always ask your healthcare professional. [facility] [employer] disclaims any warranty or liability for your use of this information.    Preventing Falls: Care Instructions  Injuries and health problems such as trouble walking or poor eyesight can increase your risk of falling. So can some medicines. But there are things you can do to help prevent falls. You can exercise to get stronger. You can also arrange your home to make it safer.    Talk to your doctor about the medicines you take. Ask if any of them increase the risk of falls and whether they can be changed or stopped.   Try to exercise regularly. It can help improve your strength and balance. This can help lower your risk of falling.         Practice fall safety and prevention.   Wear low-heeled shoes that fit well and give your feet good support. Talk to your doctor if you have foot problems that make this hard.  Carry a cellphone or wear a medical alert device that you can use to call for help.  Use stepladders instead of chairs to reach high objects. Don't climb if you're at risk for falls. Ask for help, if needed.  Wear the correct eyeglasses, if you need them.        Make your home safer.   Remove rugs, cords, clutter, and furniture from walkways.  Keep your house well lit. Use night-lights in hallways and bathrooms.  Install and use sturdy handrails on stairways.  Wear nonskid footwear, even inside. Don't walk barefoot or in socks without shoes.        Be safe outside.   Use handrails, curb cuts, and ramps whenever possible.  Keep your hands free by using a shoulder bag or backpack.  Try to walk in well-lit areas. Watch out for uneven ground, changes in pavement, and debris.  Be careful in the winter. Walk on the grass or gravel when sidewalks are slippery. Use de-icer on steps and walkways.  "Add non-slip devices to shoes.    Put grab bars and nonskid mats in your shower or tub and near the toilet. Try to use a shower chair or bath bench when bathing.   Get into a tub or shower by putting in your weaker leg first. Get out with your strong side first. Have a phone or medical alert device in the bathroom with you.   Where can you learn more?  Go to https://www.[employer].net/patiented  Enter G117 in the search box to learn more about \"Preventing Falls: Care Instructions.\"  Current as of: July 31, 2024  Content Version: 14.3    2024 [employer].   Care instructions adapted under license by your healthcare professional. If you have questions about a medical condition or this instruction, always ask your healthcare professional. [employer] disclaims any warranty or liability for your use of this information.       "

## 2025-02-25 NOTE — PROGRESS NOTES
Preventive Care Visit  [facility]  Gris Uribe MD, Family Medicine  Feb 25, 2025      Assessment & Plan     Encounter for Medicare annual wellness exam       Hyperlipidemia LDL goal <130     - simvastatin (ZOCOR) 40 MG tablet; Take 1 tablet (40 mg) by mouth at bedtime.  - Lipid panel reflex to direct LDL Fasting; Future  - Comprehensive metabolic panel (BMP + Alb, Alk Phos, ALT, AST, Total. Bili, TP); Future  - Lipid panel reflex to direct LDL Fasting  - Comprehensive metabolic panel (BMP + Alb, Alk Phos, ALT, AST, Total. Bili, TP)    Major depressive disorder with single episode, in full remission  Has a hard time swallowing larger pills so prefers taking 5 of the 25 mg tablets. Has been stable on this dose for quite some time, moods are overall good.   - amitriptyline (ELAVIL) 25 MG tablet; Take 5 tablets (125 mg) by mouth at bedtime.    Postmenopausal status  Due for DEXA, last in 2013 showed moderate osteopenia  She is not taking calcium or vitamin D right now, we discussed this  Is getting weight bearing exercise  We did discuss risks, benefits and alternatives discussed  for bisphosphonates.   - DX Bone Density; Future    Elevated fasting glucose     - Hemoglobin A1c; Future  - Hemoglobin A1c    Patient has been advised of split billing requirements and indicates understanding: Yes        Counseling  Appropriate preventive services were addressed with this patient via screening, questionnaire, or discussion as appropriate for fall prevention, nutrition, physical activity, Tobacco-use cessation, social engagement, weight loss and cognition.  Checklist reviewing preventive services available has been given to the patient.  Reviewed patient's diet, addressing concerns and/or questions.   She is at risk for lack of exercise and has been provided with information to increase physical activity for the benefit of her well-being.   The patient was instructed to see the dentist every 6  months.   Patient reported safety concerns were addressed today.        ePe Vallecillo is a 69 year old, presenting for the following:  Medicare Visit        2/25/2025    10:12 AM   Additional Questions   Roomed by Kristina sheikh CMA   Accompanied by Self           HPI      Hyperlipidemia Follow-Up  Simvastatin 40mg qhs  Are you regularly taking any medication or supplement to lower your cholesterol?   Yes- statin  Are you having muscle aches or other side effects that you think could be caused by your cholesterol lowering medication?  No    Depression   Amitriptyline 125mg qhs  How are you doing with your depression since your last visit? No change  Are you having other symptoms that might be associated with depression? No  Have you had a significant life event?  No   Are you feeling anxious or having panic attacks?   No  Do you have any concerns with your use of alcohol or other drugs? No    Social History     Tobacco Use    Smoking status: Never    Smokeless tobacco: Never   Vaping Use    Vaping status: Never Used   Substance Use Topics    Alcohol use: Yes     Comment: occ    Drug use: No         2/9/2023    10:08 AM 2/15/2024     9:40 AM 2/25/2025    10:09 AM   PHQ   PHQ-9 Total Score 0 0 1    Q9: Thoughts of better off dead/self-harm past 2 weeks Not at all  Not at all Not at all       Patient-reported    Proxy-reported         7/11/2019    11:21 AM 2/15/2024     9:51 AM 2/25/2025    10:17 AM   MENDEZ-7 SCORE   Total Score 0 0 3         2/25/2025    10:09 AM   Last PHQ-9   1.  Little interest or pleasure in doing things 0   2.  Feeling down, depressed, or hopeless 0   3.  Trouble falling or staying asleep, or sleeping too much 1   4.  Feeling tired or having little energy 0   5.  Poor appetite or overeating 0   6.  Feeling bad about yourself 0   7.  Trouble concentrating 0   8.  Moving slowly or restless 0   Q9: Thoughts of better off dead/self-harm past 2 weeks 0   PHQ-9 Total Score 1        Patient-reported          2/25/2025    10:17 AM   MENDEZ-7    1. Feeling nervous, anxious, or on edge 0   2. Not being able to stop or control worrying 1   3. Worrying too much about different things 1   4. Trouble relaxing 0   5. Being so restless that it is hard to sit still 0   6. Becoming easily annoyed or irritable 0   7. Feeling afraid, as if something awful might happen 1   MENDEZ-7 Total Score 3       Suicide Assessment Five-step Evaluation and Treatment (SAFE-T)      Health Care Directive  Patient has a Health Care Directive on file  Advance care planning document is on file and is current.      2/23/2025   General Health   How would you rate your overall physical health? Good   Feel stress (tense, anxious, or unable to sleep) Only a little   (!) STRESS CONCERN      2/23/2025   Nutrition   Diet: Low salt    Low fat/cholesterol       Multiple values from one day are sorted in reverse-chronological order         2/23/2025   Exercise   Days per week of moderate/strenous exercise 2 days   Average minutes spent exercising at this level 60 min   (!) EXERCISE CONCERN      2/23/2025   Social Factors   Frequency of gathering with friends or relatives Twice a week   Worry food won't last until get money to buy more No   Food not last or not have enough money for food? No   Do you have housing? (Housing is defined as stable permanent housing and does not include staying ouside in a car, in a tent, in an abandoned building, in an overnight shelter, or couch-surfing.) Yes   Are you worried about losing your housing? No   Lack of transportation? No   Unable to get utilities (heat,electricity)? No         2/25/2025   Fall Risk   Gait Speed Test (Document in seconds) 4   Gait Speed Test Interpretation Less than or equal to 5.00 seconds - PASS          2/23/2025   Activities of Daily Living- Home Safety   Needs help with the following daily activites None of the above   Safety concerns in the home Throw rugs in the hallway         2/23/2025   Dental    Dentist two times every year? (!) NO         2/23/2025   Hearing Screening   Hearing concerns? None of the above         2/23/2025   Driving Risk Screening   Patient/family members have concerns about driving No         2/23/2025   General Alertness/Fatigue Screening   Have you been more tired than usual lately? No         2/23/2025   Urinary Incontinence Screening   Bothered by leaking urine in past 6 months No         2/12/2024   TB Screening   Were you born outside of the [place]? No       Today's PHQ-9 Score:       2/25/2025    10:09 AM   PHQ-9 SCORE   PHQ-9 Total Score MyChart 1 (Minimal depression)   PHQ-9 Total Score 1        Patient-reported         2/23/2025   Substance Use   Alcohol more than 3/day or more than 7/wk Not Applicable   Do you have a current opioid prescription? No   How severe/bad is pain from 1 to 10? 0/10 (No Pain)   Do you use any other substances recreationally? No     Social History     Tobacco Use    Smoking status: Never    Smokeless tobacco: Never   Vaping Use    Vaping status: Never Used   Substance Use Topics    Alcohol use: Yes     Comment: occ    Drug use: No           1/27/2025   LAST FHS-7 RESULTS   1st degree relative breast or ovarian cancer No   Any relative bilateral breast cancer No   Any male have breast cancer No   Any ONE woman have BOTH breast AND ovarian cancer No   Any woman with breast cancer before 50yrs No   2 or more relatives with breast AND/OR ovarian cancer No   2 or more relatives with breast AND/OR bowel cancer No        Mammogram Screening - Mammogram every 1-2 years updated in Health Maintenance based on mutual decision making      History of abnormal Pap smear: No - age 65 or older with adequate negative prior screening test results (3 consecutive negative cytology results, 2 consecutive negative cotesting results, or 2 consecutive negative HrHPV test results within 10 years, with the most recent test occurring within the recommended screening interval for  the test used)        Latest Ref Rng & Units 7/31/2020    10:02 AM 7/31/2020     9:51 AM 11/4/2015    11:21 AM   PAP / HPV   PAP (Historical)  NIL      HPV 16 DNA NEG^Negative  Negative  Negative    HPV 18 DNA NEG^Negative  Negative  Negative    Other HR HPV NEG^Negative  Negative  Negative      ASCVD Risk   The 10-year ASCVD risk score (David DOS SANTOS, et al., 2019) is: 6.6%    Values used to calculate the score:      Age: 69 years      Sex: Female      Is Non- : No      Diabetic: No      Tobacco smoker: No      Systolic Blood Pressure: 126 mmHg      Is BP treated: No      HDL Cholesterol: 64 mg/dL      Total Cholesterol: 166 mg/dL            Reviewed and updated as needed this visit by Provider                      Current providers sharing in care for this patient include:  Patient Care Team:  Gris Uribe MD as PCP - General (Family Practice)  Gris Uribe MD as Assigned PCP    The following health maintenance items are reviewed in Epic and correct as of today:  Health Maintenance   Topic Date Due    DTAP/TDAP/TD IMMUNIZATION (2 - Td or Tdap) 08/14/2024    INFLUENZA VACCINE (1) 09/01/2024    LIPID  02/15/2025    ANNUAL REVIEW OF HM ORDERS  02/15/2025    COVID-19 Vaccine (5 - 2024-25 season) 06/10/2025    PHQ-9  08/25/2025    MEDICARE ANNUAL WELLNESS VISIT  02/25/2026    FALL RISK ASSESSMENT  02/25/2026    COLORECTAL CANCER SCREENING  12/09/2026    MAMMO SCREENING  01/27/2027    GLUCOSE  02/15/2027    DEXA  09/26/2028    ADVANCE CARE PLANNING  02/25/2030    RSV VACCINE (1 - 1-dose 75+ series) 03/18/2031    HEPATITIS C SCREENING  Completed    DEPRESSION ACTION PLAN  Completed    Pneumococcal Vaccine: 50+ Years  Completed    ZOSTER IMMUNIZATION  Completed    HPV IMMUNIZATION  Aged Out    MENINGITIS IMMUNIZATION  Aged Out    PAP  Discontinued         Review of Systems  Constitutional, HEENT, cardiovascular, pulmonary, gi and gu systems are negative, except as otherwise noted.    "  Objective    Exam  /70   Pulse 82   Temp 97.2  F (36.2  C) (Tympanic)   Ht 1.562 m (5' 1.5\")   Wt 61 kg (134 lb 6 oz)   LMP  (LMP Unknown)   SpO2 98%   BMI 24.98 kg/m     Estimated body mass index is 24.98 kg/m  as calculated from the following:    Height as of this encounter: 1.562 m (5' 1.5\").    Weight as of this encounter: 61 kg (134 lb 6 oz).    Physical Exam  GENERAL: alert and no distress  NECK: no adenopathy, no asymmetry, masses, or scars  RESP: lungs clear to auscultation - no rales, rhonchi or wheezes  BREAST: normal without masses, tenderness or nipple discharge and no palpable axillary masses or adenopathy  CV: regular rate and rhythm, normal S1 S2, no S3 or S4, no murmur, click or rub, no peripheral edema  ABDOMEN: soft, nontender, no hepatosplenomegaly, no masses and bowel sounds normal  MS: no gross musculoskeletal defects noted, no edema  NEURO: Normal strength and tone, mentation intact and speech normal  PSYCH: mentation appears normal, affect normal/bright         2/25/2025   Mini Cog   Clock Draw Score 2 Normal   3 Item Recall 3 objects recalled   Mini Cog Total Score 5              Signed Electronically by: Gris Uribe MD    "

## 2025-05-08 ENCOUNTER — HOSPITAL ENCOUNTER (OUTPATIENT)
Dept: BONE DENSITY | Facility: CLINIC | Age: 70
Discharge: HOME | End: 2025-05-08
Attending: FAMILY MEDICINE
Payer: MEDICARE

## 2025-05-08 DIAGNOSIS — Z78.0 POSTMENOPAUSAL STATUS: ICD-10-CM

## 2025-05-08 PROCEDURE — 77080 DXA BONE DENSITY AXIAL: CPT

## 2025-05-12 ENCOUNTER — RESULTS FOLLOW-UP (OUTPATIENT)
Dept: FAMILY MEDICINE | Facility: CLINIC | Age: 70
End: 2025-05-12

## 2025-05-12 NOTE — RESULT ENCOUNTER NOTE
The bone density test shows osteopenia. This is an intermediate category that is in between normal and osteoporosis.  People with osteopenia should work on taking in 8786-6427 mg of calcium with vitamin D daily. You should also be getting daily weight bearing exercise (walking works).      At this time, medication is not indicated.    Gris Uribe M.D.

## (undated) DEVICE — STOCKING SLEEVE COMPRESSION CALF MED

## (undated) DEVICE — GLOVE PROTEXIS W/NEU-THERA 6.5  2D73TE65

## (undated) DEVICE — ESU PENCIL W/COATED BLADE E2450H

## (undated) DEVICE — SU VICRYL 2-0 CT-2 CR 8X18" J726D

## (undated) DEVICE — TUBING SUCTION MEDI-VAC 1/4"X20' N620A

## (undated) DEVICE — BLADE KNIFE SURG 15 371115

## (undated) DEVICE — PACK LAPAROSCOPY/PELVISCOPY STD

## (undated) DEVICE — PAD PERI INDIV WRAP 11" 2022

## (undated) DEVICE — SOL WATER IRRIG 1000ML BOTTLE 07139-09

## (undated) DEVICE — SOL NACL 0.9% IRRIG 1000ML BOTTLE 07138-09

## (undated) DEVICE — SU VICRYL 2-0 CT-2 27" UND J269H

## (undated) DEVICE — LUBRICATING JELLY 4.25OZ

## (undated) DEVICE — GOWN LG DISP 9515

## (undated) DEVICE — SUCTION TIP YANKAUER STR K87

## (undated) DEVICE — CATH TRAY FOLEY SURESTEP 16FR W/URINE MTR STATLK LF A303416A

## (undated) DEVICE — DECANTER VIAL 2006S

## (undated) DEVICE — SU VICRYL 3-0 CT-1 36" J944H

## (undated) RX ORDER — KETOROLAC TROMETHAMINE 30 MG/ML
INJECTION, SOLUTION INTRAMUSCULAR; INTRAVENOUS
Status: DISPENSED
Start: 2018-09-25

## (undated) RX ORDER — OXYCODONE HCL 10 MG/1
TABLET, FILM COATED, EXTENDED RELEASE ORAL
Status: DISPENSED
Start: 2018-09-25

## (undated) RX ORDER — LIDOCAINE HYDROCHLORIDE 10 MG/ML
INJECTION, SOLUTION EPIDURAL; INFILTRATION; INTRACAUDAL; PERINEURAL
Status: DISPENSED
Start: 2018-09-25

## (undated) RX ORDER — EPHEDRINE SULFATE 50 MG/ML
INJECTION, SOLUTION INTRAMUSCULAR; INTRAVENOUS; SUBCUTANEOUS
Status: DISPENSED
Start: 2018-09-25

## (undated) RX ORDER — PROPOFOL 10 MG/ML
INJECTION, EMULSION INTRAVENOUS
Status: DISPENSED
Start: 2018-09-25

## (undated) RX ORDER — DEXAMETHASONE SODIUM PHOSPHATE 4 MG/ML
INJECTION, SOLUTION INTRA-ARTICULAR; INTRALESIONAL; INTRAMUSCULAR; INTRAVENOUS; SOFT TISSUE
Status: DISPENSED
Start: 2018-09-25

## (undated) RX ORDER — BUPIVACAINE HYDROCHLORIDE AND EPINEPHRINE 5; 5 MG/ML; UG/ML
INJECTION, SOLUTION EPIDURAL; INTRACAUDAL; PERINEURAL
Status: DISPENSED
Start: 2018-09-25

## (undated) RX ORDER — PHENAZOPYRIDINE HYDROCHLORIDE 200 MG/1
TABLET, FILM COATED ORAL
Status: DISPENSED
Start: 2018-09-25

## (undated) RX ORDER — FENTANYL CITRATE 50 UG/ML
INJECTION, SOLUTION INTRAMUSCULAR; INTRAVENOUS
Status: DISPENSED
Start: 2018-09-25

## (undated) RX ORDER — ONDANSETRON 2 MG/ML
INJECTION INTRAMUSCULAR; INTRAVENOUS
Status: DISPENSED
Start: 2018-09-25